# Patient Record
Sex: FEMALE | Race: WHITE | Employment: FULL TIME | URBAN - METROPOLITAN AREA
[De-identification: names, ages, dates, MRNs, and addresses within clinical notes are randomized per-mention and may not be internally consistent; named-entity substitution may affect disease eponyms.]

---

## 2017-03-06 ENCOUNTER — OFFICE VISIT (OUTPATIENT)
Dept: FAMILY MEDICINE CLINIC | Facility: CLINIC | Age: 47
End: 2017-03-06

## 2017-03-06 VITALS
TEMPERATURE: 99 F | HEART RATE: 74 BPM | BODY MASS INDEX: 20.09 KG/M2 | HEIGHT: 66 IN | RESPIRATION RATE: 18 BRPM | SYSTOLIC BLOOD PRESSURE: 110 MMHG | DIASTOLIC BLOOD PRESSURE: 60 MMHG | WEIGHT: 125 LBS

## 2017-03-06 DIAGNOSIS — Z00.00 ANNUAL PHYSICAL EXAM: Primary | ICD-10-CM

## 2017-03-06 DIAGNOSIS — D22.9 ATYPICAL NEVI: ICD-10-CM

## 2017-03-06 DIAGNOSIS — R07.89 ATYPICAL CHEST PAIN: ICD-10-CM

## 2017-03-06 PROCEDURE — 99396 PREV VISIT EST AGE 40-64: CPT | Performed by: FAMILY MEDICINE

## 2017-03-06 PROCEDURE — 93000 ELECTROCARDIOGRAM COMPLETE: CPT | Performed by: FAMILY MEDICINE

## 2017-03-06 NOTE — PROGRESS NOTES
HPI:   Taj Julio is a 55year old female who presents for a complete physical exam.     Wt Readings from Last 6 Encounters:  03/06/17 : 125 lb  02/29/16 : 123 lb  02/24/15 : 127 lb  03/17/14 : 127 lb  02/18/14 : 130 lb  12/04/12 : 120 lb    Body mass in Never Used                        Alcohol Use: Yes                Comment: occasional    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: .   Works for a nursing home  Exercise: none  Diet: watches calories closely     REVIEW OF SYSTEMS:   GENERAL: feels well otherwise  SKIN: d

## 2017-03-10 ENCOUNTER — HOSPITAL ENCOUNTER (OUTPATIENT)
Dept: CV DIAGNOSTICS | Age: 47
Discharge: HOME | End: 2017-03-10
Attending: FAMILY MEDICINE
Payer: COMMERCIAL

## 2017-03-10 DIAGNOSIS — R07.89 ATYPICAL CHEST PAIN: ICD-10-CM

## 2017-03-10 PROCEDURE — 93350 STRESS TTE ONLY: CPT | Performed by: INTERNAL MEDICINE

## 2017-03-10 PROCEDURE — 93017 CV STRESS TEST TRACING ONLY: CPT

## 2017-03-10 PROCEDURE — 93018 CV STRESS TEST I&R ONLY: CPT | Performed by: INTERNAL MEDICINE

## 2017-03-10 PROCEDURE — 93350 STRESS TTE ONLY: CPT

## 2017-03-13 DIAGNOSIS — S46.912A LEFT SHOULDER STRAIN, INITIAL ENCOUNTER: Primary | ICD-10-CM

## 2017-03-22 ENCOUNTER — TELEPHONE (OUTPATIENT)
Dept: FAMILY MEDICINE CLINIC | Facility: CLINIC | Age: 47
End: 2017-03-22

## 2017-03-22 NOTE — TELEPHONE ENCOUNTER
dropped off test results and wants Luan Morgan to view and enter into their file. Brought to Triage for review.

## 2017-04-11 ENCOUNTER — OFFICE VISIT (OUTPATIENT)
Dept: PHYSICAL THERAPY | Age: 47
End: 2017-04-11
Attending: FAMILY MEDICINE
Payer: COMMERCIAL

## 2017-04-11 DIAGNOSIS — S46.912A LEFT SHOULDER STRAIN, INITIAL ENCOUNTER: Primary | ICD-10-CM

## 2017-04-11 PROCEDURE — 97161 PT EVAL LOW COMPLEX 20 MIN: CPT

## 2017-04-11 NOTE — PROGRESS NOTES
UPPER EXTREMITY EVALUATION:   Referring Physician: Dr. Yuli Fried  Diagnosis: Left shoulder strain, initial encounter (P99.141Z)    Date of Service: 4/11/2017     PATIENT Arielle Leroy is a 55year old y/o female who presents to therapy today with c go away quick with shaking and moving  Nature: mechanical - comes on with positioning, Possibly an inflammatory component because the chest soreness is constant at varying degrees  Stage: Pain modulation and restoration of function  Stability: high, seem t Precautions:  None  OBJECTIVE:   BP: 108/68  HR: 77  Observation/Posture: moderate forward rolled shoulders, kyphotic thoracic spine (not fixed, able to readjust when drawing attention to posture)  Palpation: palpable tenderness in L pec major, L later specifically assess at next visit.       Strength/MMT:  Shoulder Elbow Wrist    Flexion: R 5/5; L 5/5  Abduction: R 5/5; L 5/5  ER: R 5/5; L 5/5  IR: R 5/5; L 5/5 Flexion: R 5/5; L 5/5  Extension: R 5/5; L 5/5   Extension: R 5/5; L 5/5  Flexion: R 5/5, L 5/ Therapeutic Exercises; Neuromuscular Re-education; Therapeutic Activity; Electrical Stim; Ultrasound;  Pt education; Home exercise program instructions;     Education or treatment limitation: None  Rehab Potential:good - eager to abolish symptoms but very u

## 2017-04-18 ENCOUNTER — OFFICE VISIT (OUTPATIENT)
Dept: PHYSICAL THERAPY | Age: 47
End: 2017-04-18
Attending: FAMILY MEDICINE
Payer: COMMERCIAL

## 2017-04-18 PROCEDURE — 97110 THERAPEUTIC EXERCISES: CPT

## 2017-04-18 PROCEDURE — 97140 MANUAL THERAPY 1/> REGIONS: CPT

## 2017-04-18 NOTE — PROGRESS NOTES
Dx: Left shoulder strain, initial encounter (T51.974L)           Authorized # of Visits:  10         Next MD visit: none scheduled  Fall Risk: standard         Precautions: n/a             Subjective: Over the last week, she had decreased intensity of symp reproduction of arm symptoms.  She is presenting today with decreased anterior left sided chest symptoms, slightly increased posterior left sided scapula/back symptoms, but is improving in postural awareness with less noted arm symptoms with provocative mot spine, neural tension and symptoms modification; education regarding symptoms and importance of therapy as well as role of therapy in her current condition; skilled selection of exercises based on presentation, impairments, severity and irritability of cur

## 2017-04-25 ENCOUNTER — OFFICE VISIT (OUTPATIENT)
Dept: PHYSICAL THERAPY | Age: 47
End: 2017-04-25
Attending: FAMILY MEDICINE
Payer: COMMERCIAL

## 2017-04-25 PROCEDURE — 97112 NEUROMUSCULAR REEDUCATION: CPT

## 2017-04-25 PROCEDURE — 97140 MANUAL THERAPY 1/> REGIONS: CPT

## 2017-04-25 PROCEDURE — 97110 THERAPEUTIC EXERCISES: CPT

## 2017-04-25 NOTE — PROGRESS NOTES
Dx: Left shoulder strain, initial encounter (O33.695P)           Authorized # of Visits:  10         Next MD visit: none scheduled  Fall Risk: standard         Precautions: n/a             Subjective: Over the last week, she has noticed less overall arm sy and compliant with HEP (10 visits)  · Pt to report ability to drive in car for >28 min with <2/10 symptoms in chest and left arm (10 visits)  · Pt will demonstrate full AROM of left shoulder without symptoms in order to improve ability to reach up to top s x 1, Nm re ed x 1 Mt x 1       Total Timed Treatment: 45 min  Total Treatment Time: 45 min

## 2017-04-27 ENCOUNTER — OFFICE VISIT (OUTPATIENT)
Dept: PHYSICAL THERAPY | Age: 47
End: 2017-04-27
Attending: FAMILY MEDICINE
Payer: COMMERCIAL

## 2017-04-27 PROCEDURE — 97110 THERAPEUTIC EXERCISES: CPT

## 2017-04-27 PROCEDURE — 97140 MANUAL THERAPY 1/> REGIONS: CPT

## 2017-04-27 PROCEDURE — 97112 NEUROMUSCULAR REEDUCATION: CPT

## 2017-04-27 NOTE — PROGRESS NOTES
Dx: Left shoulder strain, initial encounter (W63.973X)           Authorized # of Visits:  10         Next MD visit: none scheduled  Fall Risk: standard         Precautions: n/a             Subjective: states that she has not had arm symptoms since I gave t and distraction, physiologic and accessory motion of the cervical and thoracic spine in attempts to reproduce and find source of distal arm symptoms. 1 visit MET 4/20/2017   · Pt will be IND and compliant with HEP (10 visits)  · Pt to report ability to driv Supine  Radial nerve glide 10x3 (moving distal arm)    Median nerve glide (moving distal wrist) 10x3        Supine ULTT testing and passive nerve gliding and flossing    5 min Supine ULTT testing and passive nerve gliding and flossing    5 min         Foam

## 2017-05-02 ENCOUNTER — OFFICE VISIT (OUTPATIENT)
Dept: PHYSICAL THERAPY | Age: 47
End: 2017-05-02
Attending: FAMILY MEDICINE
Payer: COMMERCIAL

## 2017-05-02 PROCEDURE — 97112 NEUROMUSCULAR REEDUCATION: CPT

## 2017-05-02 PROCEDURE — 97140 MANUAL THERAPY 1/> REGIONS: CPT

## 2017-05-02 PROCEDURE — 97110 THERAPEUTIC EXERCISES: CPT

## 2017-05-02 NOTE — PROGRESS NOTES
Dx: Left shoulder strain, initial encounter (S97.586E)           Authorized # of Visits:  10         Next MD visit: none scheduled  Fall Risk: standard         Precautions: n/a             Subjective: States she has had minimal to no arm pain.  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  (10 Supine  Radial nerve glide 10x3 (moving distal arm)    Median nerve glide (moving distal wrist) 10x3 Supine  Radial nerve glide 10x3 (moving distal arm)    Median nerve glide (moving distal wrist) 10x3       Supine ULTT testing and passive nerve gliding an

## 2017-05-04 ENCOUNTER — APPOINTMENT (OUTPATIENT)
Dept: PHYSICAL THERAPY | Age: 47
End: 2017-05-04
Attending: FAMILY MEDICINE
Payer: COMMERCIAL

## 2017-05-08 ENCOUNTER — OFFICE VISIT (OUTPATIENT)
Dept: PHYSICAL THERAPY | Age: 47
End: 2017-05-08
Attending: FAMILY MEDICINE
Payer: COMMERCIAL

## 2017-05-08 PROCEDURE — 97140 MANUAL THERAPY 1/> REGIONS: CPT

## 2017-05-08 PROCEDURE — 97110 THERAPEUTIC EXERCISES: CPT

## 2017-05-08 PROCEDURE — 97112 NEUROMUSCULAR REEDUCATION: CPT

## 2017-05-08 NOTE — PROGRESS NOTES
Dx: Left shoulder strain, initial encounter (Y15.965S)           Authorized # of Visits:  10         Next MD visit: none scheduled  Fall Risk: standard         Precautions: n/a             Subjective: Patient states she has been feeling good, hasn't had ar attempts to reproduce and find source of distal arm symptoms. 1 visit MET 4/20/2017   · Pt will be IND and compliant with HEP (10 visits)  · Pt to report ability to drive in car for >76 min with <2/10 symptoms in chest and left arm (10 visits)  · Pt will de retraction with YTB ER 12x2    Seated on swiss ball RTB 8x2 ROW     Row RTB 12 x 2 Row RTB 12 x 2 Row RTB 12 x 2 Seated on swiss ball Row RTB 12 x 2    RTB 8x2 shoulder extension with swiss ball TRX Row 8x2 - verbal cues for scapular motion     Cervical si

## 2017-05-16 ENCOUNTER — OFFICE VISIT (OUTPATIENT)
Dept: PHYSICAL THERAPY | Age: 47
End: 2017-05-16
Attending: FAMILY MEDICINE
Payer: COMMERCIAL

## 2017-05-16 PROCEDURE — 97112 NEUROMUSCULAR REEDUCATION: CPT

## 2017-05-16 PROCEDURE — 97140 MANUAL THERAPY 1/> REGIONS: CPT

## 2017-05-16 PROCEDURE — 97110 THERAPEUTIC EXERCISES: CPT

## 2017-05-16 NOTE — PROGRESS NOTES
Dx: Left shoulder strain, initial encounter (X40.209M)           Authorized # of Visits:  10         Next MD visit: none scheduled  Fall Risk: standard         Precautions: n/a             Subjective: Patient states she has has increased stress this weeken will demonstrate full AROM of left shoulder without symptoms in order to improve ability to reach up to top shelf of cabinet (10 visits)  · Pt will report ability to type at work for >1 hour with <2/10 symptoms in chest and left arm to decrease interferenc 12x2       Row RTB 12 x 2 Row RTB 12 x 2 Row RTB 12 x 2 Seated on swiss ball Row RTB 12 x 2    RTB 8x2 shoulder extension with swiss ball TRX Row 8x2 - verbal cues for scapular motion TRX Row 8x2 - verbal cues for scapular motion    Cervical side glides 5 min  Total Treatment Time: 45 min

## 2017-05-23 ENCOUNTER — OFFICE VISIT (OUTPATIENT)
Dept: PHYSICAL THERAPY | Age: 47
End: 2017-05-23
Attending: FAMILY MEDICINE
Payer: COMMERCIAL

## 2017-05-23 PROCEDURE — 97110 THERAPEUTIC EXERCISES: CPT

## 2017-05-23 NOTE — PROGRESS NOTES
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  (10 visits)  · Pt to [place] 12x2    Seated on swiss ball RTB 8x2 ROW Over head millitary press 10x2 3#*    Chicken wing exercise on wall 12x2    Over head millitary press 10x2 3#    Upright row 10x2 with 3#   Row RTB 12 x 2 Row RTB 12 x 2 Row RTB 12 x 2 Seated on swiss ball Row RTB 1 skilled assessment of cervical spine, thoracic spine, neural tension and symptoms modification; education regarding symptoms and importance of therapy as well as role of therapy in her current condition; skilled selection of exercises based on presentation

## 2017-05-30 ENCOUNTER — APPOINTMENT (OUTPATIENT)
Dept: PHYSICAL THERAPY | Age: 47
End: 2017-05-30
Attending: FAMILY MEDICINE
Payer: COMMERCIAL

## 2017-06-06 ENCOUNTER — OFFICE VISIT (OUTPATIENT)
Dept: PHYSICAL THERAPY | Age: 47
End: 2017-06-06
Attending: FAMILY MEDICINE
Payer: COMMERCIAL

## 2017-06-06 PROCEDURE — 97110 THERAPEUTIC EXERCISES: CPT

## 2017-06-06 NOTE — PROGRESS NOTES
Discharge Summary    Pt has attended 9, cancelled 0, and no shown 0 visits in Physical Therapy. Subjective: States she feels like she has improved and has been able to manage her HEP independently without symptoms.  She states that she has been able to (less symptomatic than flexion)  [place] ER + flex: R T3; L T2  [place] IR + ext: R T8; L T8  Flexion: R 161; L 155 slight pull in chest   Abduction: R (WNL not formally measured);  L 145 no pain  [place] ER + flex: R T5; L T3  [place] IR + ext: R T9; L T8  (10 visits) MET 5/16/2017   · Pt will demonstrate full AROM of left shoulder without symptoms in order to improve ability to reach up to top shelf of cabinet (10 visits) MET 6/6/2017   · Pt will report ability to type at work for >1 hour with <2/10 symptom Door way pec stretch 30 sec x 3   Prone I 12x2     Prone T 12 x1  - gives arm symptoms Prone I 12x2     Prone T 12 x2 Prone I 12x2     Prone T 12 x2 Prone I 12x2 1#    Prone T  8x2 1# Swiss ball   Prone I 10x2    Prone T  10x2  Swiss ball   Prone I 10x2 nerve gliding and flossing    5 min Supine ULTT testing and passive nerve gliding and flossing    5 min Supine ULTT testing and passive nerve gliding and flossing    5 min Standing nerve flossing 5 min - median nerve and radial  Standing nerve flossing 5 m

## 2017-10-22 NOTE — PROGRESS NOTES
HPI:   Corrinne Berlin is a 52year old female here with right groin pain     It started 2 weeks ago   Size has fluctuated   Pain will radiate at times to her right labia   No fever or chills  No weight loss        Current Outpatient Prescriptions:  Elvia James distress  Right groin: + 2cm well circumscribed mass no overlying warmth or redness   EXTREMITIES: no cyanosis, clubbing or edema  NEURO: cranial nerves are intact,motor and sensory are grossly intact    ASSESSMENT AND PLAN:   Diana Umana is a 52year old

## 2017-10-23 ENCOUNTER — OFFICE VISIT (OUTPATIENT)
Dept: FAMILY MEDICINE CLINIC | Facility: CLINIC | Age: 47
End: 2017-10-23

## 2017-10-23 ENCOUNTER — TELEPHONE (OUTPATIENT)
Dept: FAMILY MEDICINE CLINIC | Facility: CLINIC | Age: 47
End: 2017-10-23

## 2017-10-23 ENCOUNTER — HOSPITAL ENCOUNTER (OUTPATIENT)
Dept: ULTRASOUND IMAGING | Age: 47
Discharge: HOME | End: 2017-10-23
Attending: FAMILY MEDICINE
Payer: COMMERCIAL

## 2017-10-23 VITALS
WEIGHT: 126 LBS | SYSTOLIC BLOOD PRESSURE: 124 MMHG | BODY MASS INDEX: 20.25 KG/M2 | RESPIRATION RATE: 18 BRPM | HEART RATE: 72 BPM | OXYGEN SATURATION: 98 % | DIASTOLIC BLOOD PRESSURE: 80 MMHG | TEMPERATURE: 99 F | HEIGHT: 66 IN

## 2017-10-23 DIAGNOSIS — R19.09 RIGHT GROIN MASS: Primary | ICD-10-CM

## 2017-10-23 DIAGNOSIS — R19.09 RIGHT GROIN MASS: ICD-10-CM

## 2017-10-23 PROCEDURE — 99213 OFFICE O/P EST LOW 20 MIN: CPT | Performed by: FAMILY MEDICINE

## 2017-10-23 PROCEDURE — 76882 US LMTD JT/FCL EVL NVASC XTR: CPT | Performed by: FAMILY MEDICINE

## 2017-10-23 NOTE — TELEPHONE ENCOUNTER
Patient called and given result of U/S of Groin. Per Dr. Reyna Sites patient referred to Dr. Vlad Gresham.  Labs ordered and entered into [facility] per patient request.

## 2017-10-23 NOTE — TELEPHONE ENCOUNTER
WANTS RESULTS OF US OF GROIN DONE TODAY. THE TECH TOLD HER WE SHOULD HAVE RESULTS IN A COUPLE HOURS. Cici Rehman PLS CALL HER BACK TO ADVISE.

## 2017-10-24 ENCOUNTER — TELEPHONE (OUTPATIENT)
Dept: FAMILY MEDICINE CLINIC | Facility: CLINIC | Age: 47
End: 2017-10-24

## 2017-10-24 DIAGNOSIS — R19.09 RIGHT GROIN MASS: Primary | ICD-10-CM

## 2017-10-24 NOTE — TELEPHONE ENCOUNTER
Please change patients labs back to [facility]. She is going to book appointment for Thursday. Please call her and let her know when changed. Thank you.

## 2017-10-26 ENCOUNTER — APPOINTMENT (OUTPATIENT)
Dept: LAB | Age: 47
End: 2017-10-26
Attending: FAMILY MEDICINE
Payer: COMMERCIAL

## 2017-10-26 PROCEDURE — 36415 COLL VENOUS BLD VENIPUNCTURE: CPT | Performed by: FAMILY MEDICINE

## 2017-10-26 PROCEDURE — 80053 COMPREHEN METABOLIC PANEL: CPT | Performed by: FAMILY MEDICINE

## 2017-10-26 PROCEDURE — 85025 COMPLETE CBC W/AUTO DIFF WBC: CPT | Performed by: FAMILY MEDICINE

## 2017-10-31 ENCOUNTER — OFFICE VISIT (OUTPATIENT)
Dept: SURGERY | Facility: CLINIC | Age: 47
End: 2017-10-31

## 2017-10-31 VITALS
HEIGHT: 67 IN | BODY MASS INDEX: 20.31 KG/M2 | DIASTOLIC BLOOD PRESSURE: 81 MMHG | SYSTOLIC BLOOD PRESSURE: 125 MMHG | WEIGHT: 129.38 LBS | HEART RATE: 78 BPM

## 2017-10-31 DIAGNOSIS — R19.09 RIGHT GROIN MASS: Primary | ICD-10-CM

## 2017-10-31 PROCEDURE — 99243 OFF/OP CNSLTJ NEW/EST LOW 30: CPT | Performed by: SURGERY

## 2017-10-31 NOTE — H&P
bkoff  New Patient  Adan Escalera  8/30/1970    10/31/2017    This patient was referred by Lacey Doe DO for evaluation and consultation for right groin mass. Chief Complaint: right groin mass    History of Present Illness:  The patient is a 40-year-old MCG/24HR Intrauterine IUD, 1 each by Intrauterine route once., Disp: , Rfl:   •  B Complex Vitamins (B COMPLEX OR), Take  by mouth., Disp: , Rfl:   •  Cholecalciferol (VITAMIN D) 1000 UNITS Oral Tab, Take 2 tablets by mouth., Disp: , Rfl:       Penicillins Assessment:      The patient is a 59-year-old female who noted a protuberance in the right groin in the first week end of October. 2 weeks later she noted that this protuberance had increased.   She was evaluated and sent for a right inguinal ultrasound

## 2017-11-03 ENCOUNTER — TELEPHONE (OUTPATIENT)
Dept: SURGERY | Facility: CLINIC | Age: 47
End: 2017-11-03

## 2017-11-03 NOTE — TELEPHONE ENCOUNTER
Called to inform pt that the CT of Pelvis is authorized from 11/3 until 12/2/17 and needs to be completed in that time frame. Pt verbalized understanding.

## 2017-11-11 ENCOUNTER — HOSPITAL ENCOUNTER (OUTPATIENT)
Dept: CT IMAGING | Age: 47
Discharge: HOME | End: 2017-11-11
Attending: SURGERY
Payer: COMMERCIAL

## 2017-11-11 DIAGNOSIS — R19.09 RIGHT GROIN MASS: ICD-10-CM

## 2017-11-11 PROCEDURE — 72193 CT PELVIS W/DYE: CPT | Performed by: SURGERY

## 2017-11-16 ENCOUNTER — OFFICE VISIT (OUTPATIENT)
Dept: SURGERY | Facility: CLINIC | Age: 47
End: 2017-11-16

## 2017-11-16 VITALS
TEMPERATURE: 99 F | HEIGHT: 67 IN | SYSTOLIC BLOOD PRESSURE: 112 MMHG | HEART RATE: 76 BPM | WEIGHT: 124 LBS | DIASTOLIC BLOOD PRESSURE: 68 MMHG | BODY MASS INDEX: 19.46 KG/M2

## 2017-11-16 DIAGNOSIS — R19.09 RIGHT GROIN MASS: Primary | ICD-10-CM

## 2017-11-16 PROCEDURE — 99213 OFFICE O/P EST LOW 20 MIN: CPT | Performed by: SURGERY

## 2017-11-30 NOTE — PROGRESS NOTES
GENERAL SURGERY    Tunde Miranda MD, FACS, Yazan Capone. Mariam Espinosa MD, Camden Anderson MD, FACS  Shannon Miranda.  Alicja Al MD, Hugo Bates MD, [facility] NATHALIE Euceda PA-C         [address] [place] [address]

## 2017-12-15 ENCOUNTER — ANESTHESIA (OUTPATIENT)
Dept: SURGERY | Facility: HOSPITAL | Age: 47
End: 2017-12-15
Payer: COMMERCIAL

## 2017-12-15 ENCOUNTER — ANESTHESIA EVENT (OUTPATIENT)
Dept: SURGERY | Facility: HOSPITAL | Age: 47
End: 2017-12-15
Payer: COMMERCIAL

## 2017-12-15 ENCOUNTER — HOSPITAL ENCOUNTER (OUTPATIENT)
Facility: HOSPITAL | Age: 47
Setting detail: HOSPITAL OUTPATIENT SURGERY
Discharge: HOME | End: 2017-12-15
Attending: SURGERY | Admitting: SURGERY
Payer: COMMERCIAL

## 2017-12-15 ENCOUNTER — SURGERY (OUTPATIENT)
Age: 47
End: 2017-12-15

## 2017-12-15 VITALS
DIASTOLIC BLOOD PRESSURE: 61 MMHG | SYSTOLIC BLOOD PRESSURE: 120 MMHG | HEIGHT: 67 IN | WEIGHT: 125 LBS | TEMPERATURE: 99 F | OXYGEN SATURATION: 100 % | HEART RATE: 70 BPM | BODY MASS INDEX: 19.62 KG/M2 | RESPIRATION RATE: 16 BRPM

## 2017-12-15 DIAGNOSIS — R19.09 RIGHT GROIN MASS: ICD-10-CM

## 2017-12-15 PROCEDURE — 81025 URINE PREGNANCY TEST: CPT | Performed by: SURGERY

## 2017-12-15 PROCEDURE — 0YQ70ZZ REPAIR RIGHT FEMORAL REGION, OPEN APPROACH: ICD-10-PCS | Performed by: SURGERY

## 2017-12-15 PROCEDURE — 88302 TISSUE EXAM BY PATHOLOGIST: CPT | Performed by: SURGERY

## 2017-12-15 RX ORDER — HYDROCODONE BITARTRATE AND ACETAMINOPHEN 5; 325 MG/1; MG/1
1-2 TABLET ORAL EVERY 6 HOURS PRN
Qty: 30 TABLET | Refills: 0 | Status: SHIPPED | OUTPATIENT
Start: 2017-12-15 | End: 2017-12-22

## 2017-12-15 RX ORDER — CLINDAMYCIN PHOSPHATE 900 MG/50ML
INJECTION INTRAVENOUS
Status: DISCONTINUED | OUTPATIENT
Start: 2017-12-15 | End: 2017-12-15

## 2017-12-15 RX ORDER — HYDROMORPHONE HYDROCHLORIDE 1 MG/ML
0.4 INJECTION, SOLUTION INTRAMUSCULAR; INTRAVENOUS; SUBCUTANEOUS EVERY 5 MIN PRN
Status: DISCONTINUED | OUTPATIENT
Start: 2017-12-15 | End: 2017-12-15

## 2017-12-15 RX ORDER — CLINDAMYCIN PHOSPHATE 900 MG/50ML
900 INJECTION INTRAVENOUS ONCE
Status: DISCONTINUED | OUTPATIENT
Start: 2017-12-15 | End: 2017-12-15 | Stop reason: HOSPADM

## 2017-12-15 RX ORDER — NALOXONE HYDROCHLORIDE 0.4 MG/ML
80 INJECTION, SOLUTION INTRAMUSCULAR; INTRAVENOUS; SUBCUTANEOUS AS NEEDED
Status: DISCONTINUED | OUTPATIENT
Start: 2017-12-15 | End: 2017-12-15

## 2017-12-15 RX ORDER — HYDROCODONE BITARTRATE AND ACETAMINOPHEN 5; 325 MG/1; MG/1
2 TABLET ORAL AS NEEDED
Status: DISCONTINUED | OUTPATIENT
Start: 2017-12-15 | End: 2017-12-15

## 2017-12-15 RX ORDER — CLINDAMYCIN PHOSPHATE 900 MG/50ML
INJECTION INTRAVENOUS
Status: DISCONTINUED
Start: 2017-12-15 | End: 2017-12-15

## 2017-12-15 RX ORDER — ONDANSETRON 2 MG/ML
4 INJECTION INTRAMUSCULAR; INTRAVENOUS AS NEEDED
Status: DISCONTINUED | OUTPATIENT
Start: 2017-12-15 | End: 2017-12-15

## 2017-12-15 RX ORDER — SODIUM CHLORIDE, SODIUM LACTATE, POTASSIUM CHLORIDE, CALCIUM CHLORIDE 600; 310; 30; 20 MG/100ML; MG/100ML; MG/100ML; MG/100ML
INJECTION, SOLUTION INTRAVENOUS CONTINUOUS
Status: DISCONTINUED | OUTPATIENT
Start: 2017-12-15 | End: 2017-12-15

## 2017-12-15 RX ORDER — DEXAMETHASONE SODIUM PHOSPHATE 4 MG/ML
4 VIAL (ML) INJECTION AS NEEDED
Status: DISCONTINUED | OUTPATIENT
Start: 2017-12-15 | End: 2017-12-15

## 2017-12-15 RX ORDER — MEPERIDINE HYDROCHLORIDE 25 MG/ML
12.5 INJECTION INTRAMUSCULAR; INTRAVENOUS; SUBCUTANEOUS AS NEEDED
Status: DISCONTINUED | OUTPATIENT
Start: 2017-12-15 | End: 2017-12-15

## 2017-12-15 RX ORDER — HEPARIN SODIUM 5000 [USP'U]/ML
5000 INJECTION, SOLUTION INTRAVENOUS; SUBCUTANEOUS ONCE
Status: DISCONTINUED | OUTPATIENT
Start: 2017-12-15 | End: 2017-12-15

## 2017-12-15 RX ORDER — MIDAZOLAM HYDROCHLORIDE 1 MG/ML
1 INJECTION INTRAMUSCULAR; INTRAVENOUS EVERY 5 MIN PRN
Status: DISCONTINUED | OUTPATIENT
Start: 2017-12-15 | End: 2017-12-15

## 2017-12-15 RX ORDER — HYDROCODONE BITARTRATE AND ACETAMINOPHEN 5; 325 MG/1; MG/1
1 TABLET ORAL AS NEEDED
Status: DISCONTINUED | OUTPATIENT
Start: 2017-12-15 | End: 2017-12-15

## 2017-12-15 RX ORDER — BUPIVACAINE HYDROCHLORIDE 5 MG/ML
INJECTION, SOLUTION EPIDURAL; INTRACAUDAL AS NEEDED
Status: DISCONTINUED | OUTPATIENT
Start: 2017-12-15 | End: 2017-12-15

## 2017-12-15 RX ORDER — HEPARIN SODIUM 5000 [USP'U]/ML
INJECTION, SOLUTION INTRAVENOUS; SUBCUTANEOUS
Status: DISCONTINUED
Start: 2017-12-15 | End: 2017-12-15 | Stop reason: WASHOUT

## 2017-12-15 NOTE — H&P
Quick  History & Physical Examination    Patient Name: Talon Og  MRN: [id number]  [facility]: [id number]  YOB: 1970    Diagnosis: right groin mass    Present Illness: *The patient is a 80-year-old female who noted a protuberance in the right groi Surgical History:  No date: COLONOSCOPY  No date: OTHER      Comment: cyst removal R wrist  No date: OTHER      Comment: oral surgery  No date: OTHER      Comment: gum surgery  6/2013: UPPER GI ENDOSCOPY PERFORMED  Family History   Problem Relation Age of

## 2017-12-15 NOTE — OPERATIVE REPORT
[facility]  Op Note    Na Valencia Location: OR   CSN [id number] MRN [id number]   Admission Date 12/15/2017 Operation Date 12/15/2017   Attending Physician Jaylan Bond MD Operating Physician Aviva Roberson MD     Date of procedure:    December 15 potential intraoperative and perioperative risks were discussed with the patient and they do not have any further questions at this time and they do wish to proceed with surgery.   The patient was marked in the preoperative holding area and the marking was condition.     EBL:    minimal    Pathologic Specimen: none      George Wright MD

## 2017-12-15 NOTE — ANESTHESIA PREPROCEDURE EVALUATION
PRE-OP EVALUATION    Patient Name: Luciano Choi    Pre-op Diagnosis: Right groin mass [R19.09]    Procedure(s):  EXCISION RIGHT GROIN MASS    Surgeon(s) and Role:     Heath See MD - Primary    Pre-op vitals reviewed.   Temp: 98.8 °F (37.1 °C)  Puls reviewed.     Lab Results  Component Value Date   WBC 5.7 10/26/2017   RBC 5.14 (H) 10/26/2017   HGB 14.9 10/26/2017   HCT 46.1 10/26/2017   MCV 89.7 10/26/2017   MCH 29.0 10/26/2017   MCHC 32.3 10/26/2017   RDW 13.3 10/26/2017   .0 10/26/2017

## 2017-12-15 NOTE — ANESTHESIA POSTPROCEDURE EVALUATION
[facility] Ulis Hutchinson Patient Status:  Hospital Outpatient Surgery   Age/Gender 52year old female MRN [id number]   [facility] SURGERY Attending Nilam Jackson, [address] [facility] [address] Day # 0 PCP Jacqueline Zamora DO       Anesthesia Post-op Note

## 2017-12-22 ENCOUNTER — OFFICE VISIT (OUTPATIENT)
Dept: SURGERY | Facility: CLINIC | Age: 47
End: 2017-12-22

## 2017-12-22 VITALS
HEIGHT: 67 IN | HEART RATE: 82 BPM | SYSTOLIC BLOOD PRESSURE: 117 MMHG | WEIGHT: 120 LBS | BODY MASS INDEX: 18.83 KG/M2 | DIASTOLIC BLOOD PRESSURE: 77 MMHG | TEMPERATURE: 99 F

## 2017-12-22 DIAGNOSIS — K41.90 FEMORAL HERNIA OF RIGHT SIDE WITHOUT OBSTRUCTION OR GANGRENE: ICD-10-CM

## 2017-12-22 DIAGNOSIS — R19.09 RIGHT GROIN MASS: Primary | ICD-10-CM

## 2017-12-22 PROCEDURE — 10140 I&D HMTMA SEROMA/FLUID COLLJ: CPT | Performed by: SURGERY

## 2017-12-22 PROCEDURE — 99024 POSTOP FOLLOW-UP VISIT: CPT | Performed by: SURGERY

## 2017-12-22 NOTE — PROGRESS NOTES
Post Operative Visit Note       Active Problems  No diagnosis found.      Chief Complaint   Patient presents with:  Hernia: 1st PO EXPLORATION RIGHT GROIN AND FEMORAL HERNIORRAPHY  on 12/15         History of Present Illness         Allergies  [facility] [place] is aller mouth every other day. Disp:  Rfl:    Cholecalciferol (VITAMIN D) 1000 UNITS Oral Tab Take 2 tablets by mouth daily. Disp:  Rfl:          Review of Systems  The Review of Systems has been reviewed by me during today.   Review of Systems   Constitutional

## 2018-03-12 ENCOUNTER — OFFICE VISIT (OUTPATIENT)
Dept: FAMILY MEDICINE CLINIC | Facility: CLINIC | Age: 48
End: 2018-03-12

## 2018-03-12 VITALS
DIASTOLIC BLOOD PRESSURE: 68 MMHG | WEIGHT: 126 LBS | RESPIRATION RATE: 20 BRPM | BODY MASS INDEX: 19.78 KG/M2 | SYSTOLIC BLOOD PRESSURE: 112 MMHG | HEART RATE: 63 BPM | HEIGHT: 67 IN | TEMPERATURE: 99 F | OXYGEN SATURATION: 98 %

## 2018-03-12 DIAGNOSIS — Z00.00 ANNUAL PHYSICAL EXAM: Primary | ICD-10-CM

## 2018-03-12 PROCEDURE — 99396 PREV VISIT EST AGE 40-64: CPT | Performed by: FAMILY MEDICINE

## 2018-03-12 NOTE — PROGRESS NOTES
HPI:   Gracy Do is a 52year old female who presents for a complete physical exam.     Wt Readings from Last 6 Encounters:  03/12/18 : 126 lb  12/22/17 : 120 lb  12/15/17 : 125 lb  11/16/17 : 124 lb  10/31/17 : 129 lb 6.4 oz  10/23/17 : 126 lb    Body denies nasal congestion, sinus pain or ST  LUNGS: denies shortness of breath with exertion  CARDIOVASCULAR: denies chest pain on exertion  GI: denies abdominal pain,denies heartburn  : denies dysuria, vaginal discharge or itching  MUSCULOSKELETAL: denies

## 2018-03-20 ENCOUNTER — TELEPHONE (OUTPATIENT)
Dept: FAMILY MEDICINE CLINIC | Facility: CLINIC | Age: 48
End: 2018-03-20

## 2018-03-20 NOTE — TELEPHONE ENCOUNTER
Medical Records received from [facility] imaging exam report. records given to khanh Hutchinson to review.

## 2018-07-19 ENCOUNTER — TELEPHONE (OUTPATIENT)
Dept: FAMILY MEDICINE CLINIC | Facility: CLINIC | Age: 48
End: 2018-07-19

## 2018-07-19 DIAGNOSIS — R79.89 ABNORMAL THYROID BLOOD TEST: Primary | ICD-10-CM

## 2018-07-23 ENCOUNTER — TELEPHONE (OUTPATIENT)
Dept: FAMILY MEDICINE CLINIC | Facility: CLINIC | Age: 48
End: 2018-07-23

## 2018-07-23 NOTE — TELEPHONE ENCOUNTER
Lab results reviewed. Creatinine slightly elevated but consistent with previous results. Recommend follow up with Dr Donald Amador to discuss further. TSH elevated per wellness screening result range but normal per [facility] lab range.  Repeat TSH and T4 in 4-6 week

## 2018-07-23 NOTE — TELEPHONE ENCOUNTER
Dr. Nato Gonzalez is out of the office for the next week. I agree with physician assistant Ferrell's recommendation.

## 2018-07-23 NOTE — TELEPHONE ENCOUNTER
Pt is aware that Dr Eric Red is out of the office until next week. Pt still would like Dr Wilfredo Lopez to review once she returns.

## 2018-07-31 NOTE — TELEPHONE ENCOUNTER
No concerns regarding kidneys with a very normal GFR  Ok for repeat thyroid labs - tsh free t4 and thyroid ab now given labs done in June

## 2018-08-04 ENCOUNTER — APPOINTMENT (OUTPATIENT)
Dept: LAB | Age: 48
End: 2018-08-04
Attending: FAMILY MEDICINE
Payer: COMMERCIAL

## 2018-08-04 DIAGNOSIS — R79.89 ABNORMAL THYROID BLOOD TEST: ICD-10-CM

## 2018-08-04 LAB
T4 FREE SERPL-MCNC: 0.9 NG/DL (ref 0.9–1.8)
TSI SER-ACNC: 2.24 MIU/ML (ref 0.35–5.5)

## 2018-08-04 PROCEDURE — 36415 COLL VENOUS BLD VENIPUNCTURE: CPT | Performed by: FAMILY MEDICINE

## 2018-08-04 PROCEDURE — 84443 ASSAY THYROID STIM HORMONE: CPT | Performed by: FAMILY MEDICINE

## 2018-08-04 PROCEDURE — 84439 ASSAY OF FREE THYROXINE: CPT | Performed by: FAMILY MEDICINE

## 2019-04-07 NOTE — PROGRESS NOTES
HPI:   Tena Palomino is a 50year old female who presents for a complete physical exam.     Wt Readings from Last 6 Encounters:  04/08/19 : 126 lb  03/12/18 : 126 lb  12/22/17 : 120 lb  12/15/17 : 125 lb  11/16/17 : 124 lb  10/31/17 : 129 lb 6.4 oz    Body REVIEW OF SYSTEMS:   GENERAL: feels well otherwise  SKIN: denies any unusual skin lesions  EYES:denies blurred vision or double vision  HEENT: denies nasal congestion, sinus pain or ST  LUNGS: denies shortness of breath with exertion  CARDIOVASCULAR: d

## 2019-04-08 ENCOUNTER — OFFICE VISIT (OUTPATIENT)
Dept: FAMILY MEDICINE CLINIC | Facility: CLINIC | Age: 49
End: 2019-04-08
Payer: COMMERCIAL

## 2019-04-08 VITALS
TEMPERATURE: 98 F | SYSTOLIC BLOOD PRESSURE: 122 MMHG | HEART RATE: 68 BPM | RESPIRATION RATE: 18 BRPM | HEIGHT: 67 IN | OXYGEN SATURATION: 98 % | WEIGHT: 126 LBS | BODY MASS INDEX: 19.78 KG/M2 | DIASTOLIC BLOOD PRESSURE: 64 MMHG

## 2019-04-08 DIAGNOSIS — Z00.00 ANNUAL PHYSICAL EXAM: Primary | ICD-10-CM

## 2019-04-08 PROCEDURE — 99396 PREV VISIT EST AGE 40-64: CPT | Performed by: FAMILY MEDICINE

## 2019-06-25 ENCOUNTER — TELEPHONE (OUTPATIENT)
Dept: FAMILY MEDICINE CLINIC | Facility: CLINIC | Age: 49
End: 2019-06-25

## 2019-06-25 NOTE — TELEPHONE ENCOUNTER
Pt's  dropped off lab test results for Dr. Jessica Pal to review, test results given to Dr. Jessica Pal to review on red folder.

## 2020-07-13 ENCOUNTER — OFFICE VISIT (OUTPATIENT)
Dept: FAMILY MEDICINE CLINIC | Facility: CLINIC | Age: 50
End: 2020-07-13
Payer: COMMERCIAL

## 2020-07-13 VITALS
SYSTOLIC BLOOD PRESSURE: 120 MMHG | DIASTOLIC BLOOD PRESSURE: 68 MMHG | RESPIRATION RATE: 18 BRPM | WEIGHT: 126 LBS | OXYGEN SATURATION: 98 % | HEIGHT: 67 IN | HEART RATE: 82 BPM | TEMPERATURE: 98 F | BODY MASS INDEX: 19.78 KG/M2

## 2020-07-13 DIAGNOSIS — Z00.00 ANNUAL PHYSICAL EXAM: Primary | ICD-10-CM

## 2020-07-13 PROCEDURE — 90471 IMMUNIZATION ADMIN: CPT | Performed by: FAMILY MEDICINE

## 2020-07-13 PROCEDURE — 90715 TDAP VACCINE 7 YRS/> IM: CPT | Performed by: FAMILY MEDICINE

## 2020-07-13 PROCEDURE — 99396 PREV VISIT EST AGE 40-64: CPT | Performed by: FAMILY MEDICINE

## 2020-07-13 NOTE — PROGRESS NOTES
HPI:   Sarai Mckeon is a 52year old female who presents for a complete physical exam.     Wt Readings from Last 6 Encounters:  07/13/20 : 126 lb (57.2 kg)  04/08/19 : 126 lb (57.2 kg)  03/12/18 : 126 lb (57.2 kg)  12/22/17 : 120 lb (54.4 kg)  12/15/17 : 1 No    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: .   Works for a nursing home  Exercise: minimal   Diet: watches calories closely     REVIEW OF SYSTEMS:   GENERAL: feels well otherwise  SKIN: denies any unusual skin lesions  EYES:denies blurred vision or double vision  HE needed.

## 2020-07-29 ENCOUNTER — LAB ENCOUNTER (OUTPATIENT)
Dept: LAB | Age: 50
End: 2020-07-29
Attending: FAMILY MEDICINE
Payer: COMMERCIAL

## 2020-07-29 DIAGNOSIS — Z00.00 ROUTINE GENERAL MEDICAL EXAMINATION AT A HEALTH CARE FACILITY: Primary | ICD-10-CM

## 2020-07-29 DIAGNOSIS — Z00.00 ANNUAL PHYSICAL EXAM: ICD-10-CM

## 2020-07-29 LAB
ALBUMIN SERPL-MCNC: 3.9 G/DL (ref 3.4–5)
ALBUMIN/GLOB SERPL: 1 {RATIO} (ref 1–2)
ALP LIVER SERPL-CCNC: 66 U/L (ref 39–100)
ALT SERPL-CCNC: 21 U/L (ref 13–56)
ANION GAP SERPL CALC-SCNC: 1 MMOL/L (ref 0–18)
AST SERPL-CCNC: 16 U/L (ref 15–37)
BASOPHILS # BLD AUTO: 0.04 X10(3) UL (ref 0–0.2)
BASOPHILS NFR BLD AUTO: 0.6 %
BILIRUB SERPL-MCNC: 0.5 MG/DL (ref 0.1–2)
BUN BLD-MCNC: 18 MG/DL (ref 7–18)
BUN/CREAT SERPL: 15.5 (ref 10–20)
CALCIUM BLD-MCNC: 8.5 MG/DL (ref 8.5–10.1)
CHLORIDE SERPL-SCNC: 105 MMOL/L (ref 98–112)
CHOLEST SMN-MCNC: 184 MG/DL (ref ?–200)
CO2 SERPL-SCNC: 29 MMOL/L (ref 21–32)
CREAT BLD-MCNC: 1.16 MG/DL (ref 0.55–1.02)
DEPRECATED HBV CORE AB SER IA-ACNC: 4 NG/ML (ref 12–240)
DEPRECATED RDW RBC AUTO: 46.9 FL (ref 35.1–46.3)
EOSINOPHIL # BLD AUTO: 0.15 X10(3) UL (ref 0–0.7)
EOSINOPHIL NFR BLD AUTO: 2.3 %
ERYTHROCYTE [DISTWIDTH] IN BLOOD BY AUTOMATED COUNT: 15.5 % (ref 11–15)
GLOBULIN PLAS-MCNC: 3.8 G/DL (ref 2.8–4.4)
GLUCOSE BLD-MCNC: 91 MG/DL (ref 70–99)
HBV SURFACE AB SER QL: REACTIVE
HBV SURFACE AB SERPL IA-ACNC: 38.03 MIU/ML
HCT VFR BLD AUTO: 42.1 % (ref 35–48)
HDLC SERPL-MCNC: 86 MG/DL (ref 40–59)
HGB BLD-MCNC: 12.3 G/DL (ref 12–16)
IMM GRANULOCYTES # BLD AUTO: 0.01 X10(3) UL (ref 0–1)
IMM GRANULOCYTES NFR BLD: 0.2 %
LDLC SERPL CALC-MCNC: 87 MG/DL (ref ?–100)
LYMPHOCYTES # BLD AUTO: 2.06 X10(3) UL (ref 1–4)
LYMPHOCYTES NFR BLD AUTO: 31.1 %
M PROTEIN MFR SERPL ELPH: 7.7 G/DL (ref 6.4–8.2)
MCH RBC QN AUTO: 24.3 PG (ref 26–34)
MCHC RBC AUTO-ENTMCNC: 29.2 G/DL (ref 31–37)
MCV RBC AUTO: 83.2 FL (ref 80–100)
MONOCYTES # BLD AUTO: 0.43 X10(3) UL (ref 0.1–1)
MONOCYTES NFR BLD AUTO: 6.5 %
NEUTROPHILS # BLD AUTO: 3.94 X10 (3) UL (ref 1.5–7.7)
NEUTROPHILS # BLD AUTO: 3.94 X10(3) UL (ref 1.5–7.7)
NEUTROPHILS NFR BLD AUTO: 59.3 %
NONHDLC SERPL-MCNC: 98 MG/DL (ref ?–130)
OSMOLALITY SERPL CALC.SUM OF ELEC: 281 MOSM/KG (ref 275–295)
PATIENT FASTING Y/N/NP: YES
PATIENT FASTING Y/N/NP: YES
PLATELET # BLD AUTO: 250 10(3)UL (ref 150–450)
POTASSIUM SERPL-SCNC: 4.4 MMOL/L (ref 3.5–5.1)
RBC # BLD AUTO: 5.06 X10(6)UL (ref 3.8–5.3)
SARS-COV-2 IGG SERPLBLD QL IA.RAPID: NEGATIVE
SODIUM SERPL-SCNC: 135 MMOL/L (ref 136–145)
T4 FREE SERPL-MCNC: 1 NG/DL (ref 0.8–1.7)
TRIGL SERPL-MCNC: 54 MG/DL (ref 30–149)
TSI SER-ACNC: 2.99 MIU/ML (ref 0.36–3.74)
VIT B12 SERPL-MCNC: 412 PG/ML (ref 193–986)
VIT D+METAB SERPL-MCNC: 49.7 NG/ML (ref 30–100)
VLDLC SERPL CALC-MCNC: 11 MG/DL (ref 0–30)
WBC # BLD AUTO: 6.6 X10(3) UL (ref 4–11)

## 2020-07-29 PROCEDURE — 36415 COLL VENOUS BLD VENIPUNCTURE: CPT | Performed by: FAMILY MEDICINE

## 2020-07-29 PROCEDURE — 80050 GENERAL HEALTH PANEL: CPT | Performed by: FAMILY MEDICINE

## 2020-07-29 PROCEDURE — 80061 LIPID PANEL: CPT | Performed by: FAMILY MEDICINE

## 2020-07-29 PROCEDURE — 82607 VITAMIN B-12: CPT | Performed by: FAMILY MEDICINE

## 2020-07-29 PROCEDURE — 84439 ASSAY OF FREE THYROXINE: CPT | Performed by: FAMILY MEDICINE

## 2020-07-29 PROCEDURE — 86769 SARS-COV-2 COVID-19 ANTIBODY: CPT | Performed by: FAMILY MEDICINE

## 2020-07-29 PROCEDURE — 86706 HEP B SURFACE ANTIBODY: CPT | Performed by: FAMILY MEDICINE

## 2020-07-29 PROCEDURE — 82728 ASSAY OF FERRITIN: CPT | Performed by: FAMILY MEDICINE

## 2020-07-29 PROCEDURE — 82306 VITAMIN D 25 HYDROXY: CPT | Performed by: FAMILY MEDICINE

## 2020-08-03 DIAGNOSIS — R79.89 ELEVATED SERUM CREATININE: Primary | ICD-10-CM

## 2020-08-03 DIAGNOSIS — R79.0 LOW FERRITIN: ICD-10-CM

## 2020-08-03 DIAGNOSIS — E53.8 VITAMIN B12 DEFICIENCY: ICD-10-CM

## 2020-09-08 ENCOUNTER — LAB ENCOUNTER (OUTPATIENT)
Dept: LAB | Age: 50
End: 2020-09-08
Attending: FAMILY MEDICINE
Payer: COMMERCIAL

## 2020-09-08 DIAGNOSIS — R79.89 ELEVATED SERUM CREATININE: ICD-10-CM

## 2020-09-08 LAB
ALBUMIN SERPL-MCNC: 3.6 G/DL (ref 3.4–5)
ALBUMIN/GLOB SERPL: 1.1 {RATIO} (ref 1–2)
ALP LIVER SERPL-CCNC: 57 U/L (ref 39–100)
ALT SERPL-CCNC: 18 U/L (ref 13–56)
ANION GAP SERPL CALC-SCNC: 6 MMOL/L (ref 0–18)
AST SERPL-CCNC: 15 U/L (ref 15–37)
BILIRUB SERPL-MCNC: 0.5 MG/DL (ref 0.1–2)
BUN BLD-MCNC: 15 MG/DL (ref 7–18)
BUN/CREAT SERPL: 13.4 (ref 10–20)
CALCIUM BLD-MCNC: 9.2 MG/DL (ref 8.5–10.1)
CHLORIDE SERPL-SCNC: 103 MMOL/L (ref 98–112)
CO2 SERPL-SCNC: 28 MMOL/L (ref 21–32)
CREAT BLD-MCNC: 1.12 MG/DL (ref 0.55–1.02)
GLOBULIN PLAS-MCNC: 3.3 G/DL (ref 2.8–4.4)
GLUCOSE BLD-MCNC: 82 MG/DL (ref 70–99)
M PROTEIN MFR SERPL ELPH: 6.9 G/DL (ref 6.4–8.2)
OSMOLALITY SERPL CALC.SUM OF ELEC: 284 MOSM/KG (ref 275–295)
PATIENT FASTING Y/N/NP: YES
POTASSIUM SERPL-SCNC: 4.2 MMOL/L (ref 3.5–5.1)
SODIUM SERPL-SCNC: 137 MMOL/L (ref 136–145)

## 2020-09-08 PROCEDURE — 80053 COMPREHEN METABOLIC PANEL: CPT | Performed by: FAMILY MEDICINE

## 2020-09-08 PROCEDURE — 36415 COLL VENOUS BLD VENIPUNCTURE: CPT | Performed by: FAMILY MEDICINE

## 2020-11-04 ENCOUNTER — LAB ENCOUNTER (OUTPATIENT)
Dept: LAB | Age: 50
End: 2020-11-04
Attending: FAMILY MEDICINE
Payer: COMMERCIAL

## 2020-11-04 DIAGNOSIS — R79.0 LOW FERRITIN: ICD-10-CM

## 2020-11-04 DIAGNOSIS — E53.8 VITAMIN B12 DEFICIENCY: ICD-10-CM

## 2020-11-04 PROCEDURE — 82607 VITAMIN B-12: CPT

## 2020-11-04 PROCEDURE — 85025 COMPLETE CBC W/AUTO DIFF WBC: CPT

## 2020-11-04 PROCEDURE — 36415 COLL VENOUS BLD VENIPUNCTURE: CPT

## 2020-11-04 PROCEDURE — 82728 ASSAY OF FERRITIN: CPT

## 2021-01-22 ENCOUNTER — PATIENT MESSAGE (OUTPATIENT)
Dept: FAMILY MEDICINE CLINIC | Facility: CLINIC | Age: 51
End: 2021-01-22

## 2021-01-23 NOTE — TELEPHONE ENCOUNTER
From: Talon Og  To: Stephaine Burrows DO  Sent: 1/22/2021 3:45 PM CST  Subject: Other    Hi - Can you please upload the attached information regarding my covid vaccination to my file.   Thank you - Duke Richard

## 2021-05-06 ENCOUNTER — OFFICE VISIT (OUTPATIENT)
Dept: FAMILY MEDICINE CLINIC | Facility: CLINIC | Age: 51
End: 2021-05-06
Payer: COMMERCIAL

## 2021-05-06 ENCOUNTER — LAB ENCOUNTER (OUTPATIENT)
Dept: LAB | Age: 51
End: 2021-05-06
Attending: FAMILY MEDICINE
Payer: COMMERCIAL

## 2021-05-06 VITALS
HEIGHT: 67 IN | DIASTOLIC BLOOD PRESSURE: 80 MMHG | HEART RATE: 84 BPM | BODY MASS INDEX: 19.62 KG/M2 | TEMPERATURE: 98 F | RESPIRATION RATE: 18 BRPM | OXYGEN SATURATION: 99 % | WEIGHT: 125 LBS | SYSTOLIC BLOOD PRESSURE: 110 MMHG

## 2021-05-06 DIAGNOSIS — Z00.00 ANNUAL PHYSICAL EXAM: Primary | ICD-10-CM

## 2021-05-06 PROCEDURE — 3079F DIAST BP 80-89 MM HG: CPT | Performed by: FAMILY MEDICINE

## 2021-05-06 PROCEDURE — 3008F BODY MASS INDEX DOCD: CPT | Performed by: FAMILY MEDICINE

## 2021-05-06 PROCEDURE — 80061 LIPID PANEL: CPT | Performed by: FAMILY MEDICINE

## 2021-05-06 PROCEDURE — 99396 PREV VISIT EST AGE 40-64: CPT | Performed by: FAMILY MEDICINE

## 2021-05-06 PROCEDURE — 84439 ASSAY OF FREE THYROXINE: CPT | Performed by: FAMILY MEDICINE

## 2021-05-06 PROCEDURE — 82306 VITAMIN D 25 HYDROXY: CPT | Performed by: FAMILY MEDICINE

## 2021-05-06 PROCEDURE — 82607 VITAMIN B-12: CPT | Performed by: FAMILY MEDICINE

## 2021-05-06 PROCEDURE — 3074F SYST BP LT 130 MM HG: CPT | Performed by: FAMILY MEDICINE

## 2021-05-06 PROCEDURE — 82728 ASSAY OF FERRITIN: CPT | Performed by: FAMILY MEDICINE

## 2021-05-06 PROCEDURE — 80050 GENERAL HEALTH PANEL: CPT | Performed by: FAMILY MEDICINE

## 2021-05-06 RX ORDER — PNV NO.95/FERROUS FUM/FOLIC AC 28MG-0.8MG
TABLET ORAL
COMMUNITY
Start: 2020-08-01

## 2021-05-06 RX ORDER — DOCUSATE SODIUM 100 MG/1
CAPSULE, LIQUID FILLED ORAL
COMMUNITY
Start: 2020-08-01

## 2021-05-06 NOTE — PROGRESS NOTES
HPI:   Patricia Joy is a 48year old female who presents for a complete physical exam.     Wt Readings from Last 6 Encounters:  05/06/21 : 125 lb (56.7 kg)  07/13/20 : 126 lb (57.2 kg)  04/08/19 : 126 lb (57.2 kg)  03/12/18 : 126 lb (57.2 kg)  12/22/17 : 1 lesions  EYES:denies blurred vision or double vision  HEENT: denies nasal congestion, sinus pain or ST  LUNGS: denies shortness of breath with exertion  CARDIOVASCULAR: denies chest pain on exertion  GI: denies abdominal pain,denies heartburn  : denies d Attending Only

## 2021-05-28 ENCOUNTER — LAB ENCOUNTER (OUTPATIENT)
Dept: LAB | Age: 51
End: 2021-05-28
Attending: FAMILY MEDICINE
Payer: COMMERCIAL

## 2021-05-28 PROCEDURE — 80053 COMPREHEN METABOLIC PANEL: CPT | Performed by: FAMILY MEDICINE

## 2021-07-28 ENCOUNTER — LAB ENCOUNTER (OUTPATIENT)
Dept: LAB | Age: 51
End: 2021-07-28
Attending: FAMILY MEDICINE
Payer: COMMERCIAL

## 2021-07-28 DIAGNOSIS — R79.89 ELEVATED SERUM CREATININE: ICD-10-CM

## 2021-07-28 LAB
ALBUMIN SERPL-MCNC: 4.1 G/DL (ref 3.4–5)
ALBUMIN/GLOB SERPL: 1.3 {RATIO} (ref 1–2)
ALP LIVER SERPL-CCNC: 61 U/L
ALT SERPL-CCNC: 25 U/L
ANION GAP SERPL CALC-SCNC: 2 MMOL/L (ref 0–18)
AST SERPL-CCNC: 10 U/L (ref 15–37)
BILIRUB SERPL-MCNC: 0.7 MG/DL (ref 0.1–2)
BUN BLD-MCNC: 15 MG/DL (ref 7–18)
BUN/CREAT SERPL: 13.8 (ref 10–20)
CALCIUM BLD-MCNC: 8.8 MG/DL (ref 8.5–10.1)
CHLORIDE SERPL-SCNC: 104 MMOL/L (ref 98–112)
CO2 SERPL-SCNC: 31 MMOL/L (ref 21–32)
CREAT BLD-MCNC: 1.09 MG/DL
GLOBULIN PLAS-MCNC: 3.2 G/DL (ref 2.8–4.4)
GLUCOSE BLD-MCNC: 83 MG/DL (ref 70–99)
M PROTEIN MFR SERPL ELPH: 7.3 G/DL (ref 6.4–8.2)
OSMOLALITY SERPL CALC.SUM OF ELEC: 284 MOSM/KG (ref 275–295)
PATIENT FASTING Y/N/NP: YES
POTASSIUM SERPL-SCNC: 4 MMOL/L (ref 3.5–5.1)
SODIUM SERPL-SCNC: 137 MMOL/L (ref 136–145)

## 2021-07-28 PROCEDURE — 80053 COMPREHEN METABOLIC PANEL: CPT | Performed by: FAMILY MEDICINE

## 2022-04-08 ENCOUNTER — PATIENT MESSAGE (OUTPATIENT)
Dept: FAMILY MEDICINE CLINIC | Facility: CLINIC | Age: 52
End: 2022-04-08

## 2022-04-08 NOTE — TELEPHONE ENCOUNTER
From: Alison Duverney  To: Cyril Wesley DO  Sent: 4/8/2022 9:30 AM CDT  Subject: lab work    Hi - I am scheduled for my annual visit on May 11th and was hoping to have the bloodwork done before the visit so we can discuss. Can you please put in orders for bloodwork so I can schedule those the week before the appt and we can review at the appt.  Thank you

## 2022-05-04 ENCOUNTER — LAB ENCOUNTER (OUTPATIENT)
Dept: LAB | Age: 52
End: 2022-05-04
Attending: FAMILY MEDICINE
Payer: COMMERCIAL

## 2022-05-04 DIAGNOSIS — Z00.00 ANNUAL PHYSICAL EXAM: ICD-10-CM

## 2022-05-04 LAB
ALBUMIN SERPL-MCNC: 3.8 G/DL (ref 3.4–5)
ALBUMIN/GLOB SERPL: 1.4 {RATIO} (ref 1–2)
ALP LIVER SERPL-CCNC: 49 U/L
ALT SERPL-CCNC: 20 U/L
ANION GAP SERPL CALC-SCNC: 5 MMOL/L (ref 0–18)
AST SERPL-CCNC: 9 U/L (ref 15–37)
BASOPHILS # BLD AUTO: 0.03 X10(3) UL (ref 0–0.2)
BASOPHILS NFR BLD AUTO: 0.4 %
BILIRUB SERPL-MCNC: 0.6 MG/DL (ref 0.1–2)
BUN BLD-MCNC: 16 MG/DL (ref 7–18)
CALCIUM BLD-MCNC: 8.9 MG/DL (ref 8.5–10.1)
CHLORIDE SERPL-SCNC: 106 MMOL/L (ref 98–112)
CHOLEST SERPL-MCNC: 157 MG/DL (ref ?–200)
CO2 SERPL-SCNC: 28 MMOL/L (ref 21–32)
CREAT BLD-MCNC: 1.07 MG/DL
EOSINOPHIL # BLD AUTO: 0.1 X10(3) UL (ref 0–0.7)
EOSINOPHIL NFR BLD AUTO: 1.4 %
ERYTHROCYTE [DISTWIDTH] IN BLOOD BY AUTOMATED COUNT: 13.9 %
FASTING PATIENT LIPID ANSWER: YES
FASTING STATUS PATIENT QL REPORTED: YES
GLOBULIN PLAS-MCNC: 2.7 G/DL (ref 2.8–4.4)
GLUCOSE BLD-MCNC: 95 MG/DL (ref 70–99)
HCT VFR BLD AUTO: 43.6 %
HDLC SERPL-MCNC: 76 MG/DL (ref 40–59)
HGB BLD-MCNC: 14.4 G/DL
IMM GRANULOCYTES # BLD AUTO: 0.01 X10(3) UL (ref 0–1)
IMM GRANULOCYTES NFR BLD: 0.1 %
LDLC SERPL CALC-MCNC: 66 MG/DL (ref ?–100)
LYMPHOCYTES # BLD AUTO: 1.81 X10(3) UL (ref 1–4)
LYMPHOCYTES NFR BLD AUTO: 25.4 %
MCH RBC QN AUTO: 30.2 PG (ref 26–34)
MCHC RBC AUTO-ENTMCNC: 33 G/DL (ref 31–37)
MCV RBC AUTO: 91.4 FL
MONOCYTES # BLD AUTO: 0.36 X10(3) UL (ref 0.1–1)
MONOCYTES NFR BLD AUTO: 5.1 %
NEUTROPHILS # BLD AUTO: 4.81 X10 (3) UL (ref 1.5–7.7)
NEUTROPHILS # BLD AUTO: 4.81 X10(3) UL (ref 1.5–7.7)
NEUTROPHILS NFR BLD AUTO: 67.6 %
NONHDLC SERPL-MCNC: 81 MG/DL (ref ?–130)
OSMOLALITY SERPL CALC.SUM OF ELEC: 289 MOSM/KG (ref 275–295)
PLATELET # BLD AUTO: 192 10(3)UL (ref 150–450)
POTASSIUM SERPL-SCNC: 4 MMOL/L (ref 3.5–5.1)
PROT SERPL-MCNC: 6.5 G/DL (ref 6.4–8.2)
RBC # BLD AUTO: 4.77 X10(6)UL
SODIUM SERPL-SCNC: 139 MMOL/L (ref 136–145)
TRIGL SERPL-MCNC: 77 MG/DL (ref 30–149)
TSI SER-ACNC: 3.69 MIU/ML (ref 0.36–3.74)
VIT B12 SERPL-MCNC: 661 PG/ML (ref 193–986)
VIT D+METAB SERPL-MCNC: 48.8 NG/ML (ref 30–100)
VLDLC SERPL CALC-MCNC: 12 MG/DL (ref 0–30)
WBC # BLD AUTO: 7.1 X10(3) UL (ref 4–11)

## 2022-05-04 PROCEDURE — 82607 VITAMIN B-12: CPT | Performed by: FAMILY MEDICINE

## 2022-05-04 PROCEDURE — 82306 VITAMIN D 25 HYDROXY: CPT | Performed by: FAMILY MEDICINE

## 2022-05-04 PROCEDURE — 80061 LIPID PANEL: CPT | Performed by: FAMILY MEDICINE

## 2022-05-04 PROCEDURE — 80050 GENERAL HEALTH PANEL: CPT | Performed by: FAMILY MEDICINE

## 2022-05-11 ENCOUNTER — OFFICE VISIT (OUTPATIENT)
Dept: FAMILY MEDICINE CLINIC | Facility: CLINIC | Age: 52
End: 2022-05-11
Payer: COMMERCIAL

## 2022-05-11 VITALS
SYSTOLIC BLOOD PRESSURE: 112 MMHG | DIASTOLIC BLOOD PRESSURE: 74 MMHG | RESPIRATION RATE: 16 BRPM | HEIGHT: 67 IN | WEIGHT: 130 LBS | BODY MASS INDEX: 20.4 KG/M2 | HEART RATE: 81 BPM | OXYGEN SATURATION: 98 %

## 2022-05-11 DIAGNOSIS — Z00.00 ANNUAL PHYSICAL EXAM: Primary | ICD-10-CM

## 2022-05-11 PROCEDURE — 3074F SYST BP LT 130 MM HG: CPT | Performed by: FAMILY MEDICINE

## 2022-05-11 PROCEDURE — 3078F DIAST BP <80 MM HG: CPT | Performed by: FAMILY MEDICINE

## 2022-05-11 PROCEDURE — 3008F BODY MASS INDEX DOCD: CPT | Performed by: FAMILY MEDICINE

## 2022-05-11 PROCEDURE — 99396 PREV VISIT EST AGE 40-64: CPT | Performed by: FAMILY MEDICINE

## 2022-06-03 ENCOUNTER — NURSE ONLY (OUTPATIENT)
Dept: FAMILY MEDICINE CLINIC | Facility: CLINIC | Age: 52
End: 2022-06-03
Payer: COMMERCIAL

## 2022-06-03 PROCEDURE — 90750 HZV VACC RECOMBINANT IM: CPT | Performed by: FAMILY MEDICINE

## 2022-06-03 PROCEDURE — 90471 IMMUNIZATION ADMIN: CPT | Performed by: FAMILY MEDICINE

## 2022-09-07 ENCOUNTER — PATIENT MESSAGE (OUTPATIENT)
Dept: FAMILY MEDICINE CLINIC | Facility: CLINIC | Age: 52
End: 2022-09-07

## 2022-09-07 DIAGNOSIS — Z23 NEED FOR SHINGLES VACCINE: Primary | ICD-10-CM

## 2022-09-07 NOTE — TELEPHONE ENCOUNTER
From: Lencho Ontiveros  To: Babar Dubose DO  Sent: 9/7/2022 10:48 AM CDT  Subject: moving nurse appointment    Hi - I am wondering if it is possible to move my nurse appt for the 2nd shingles shot from 10/7 to 9/23? Please let me know another date near there.

## 2022-09-23 ENCOUNTER — NURSE ONLY (OUTPATIENT)
Dept: FAMILY MEDICINE CLINIC | Facility: CLINIC | Age: 52
End: 2022-09-23

## 2022-09-23 PROCEDURE — 90750 HZV VACC RECOMBINANT IM: CPT | Performed by: FAMILY MEDICINE

## 2022-09-23 PROCEDURE — 90471 IMMUNIZATION ADMIN: CPT | Performed by: FAMILY MEDICINE

## 2022-11-02 ENCOUNTER — PATIENT MESSAGE (OUTPATIENT)
Dept: FAMILY MEDICINE CLINIC | Facility: CLINIC | Age: 52
End: 2022-11-02

## 2022-11-02 NOTE — TELEPHONE ENCOUNTER
From: Fernando Harman  To: Marta Rivera DO  Sent: 11/2/2022 12:40 PM CDT  Subject: covid booster & Mammogram    I had my covid booster today and am attaching my updated card. I also have attached my mammogram proof I went on 9/6 and got called back and went for more on 9/9/22 and was told all good for another year.   Please update my records - thank you

## 2023-02-07 ENCOUNTER — OFFICE VISIT (OUTPATIENT)
Dept: PODIATRY CLINIC | Facility: CLINIC | Age: 53
End: 2023-02-07

## 2023-02-07 DIAGNOSIS — B07.0 VERRUCA PLANTARIS: Primary | ICD-10-CM

## 2023-02-07 PROCEDURE — 99203 OFFICE O/P NEW LOW 30 MIN: CPT | Performed by: PODIATRIST

## 2023-02-21 ENCOUNTER — TELEPHONE (OUTPATIENT)
Dept: PODIATRY CLINIC | Facility: CLINIC | Age: 53
End: 2023-02-21

## 2023-02-21 NOTE — TELEPHONE ENCOUNTER
Pt was in 2 weeks ago for appt and  told her he would order Advanced Circulatoryharone Plus and call her - calling for update

## 2023-02-21 NOTE — TELEPHONE ENCOUNTER
S/w Xenia and cantharone plus is not available and may not ever be available. Informed pt of this and that Dr Isauro Ribera is out of the office this wk but will s/w him when he returns on next steps for pt.

## 2023-02-27 NOTE — TELEPHONE ENCOUNTER
Please tell the patient that we can certainly take the wart off either using lasering or inhaled hyfrecation need or we can just treat it with a more topical acid but the topical acid which we will have to find a compounding pharmacy for and oftentimes insurance companies do not cover that.

## 2023-03-01 NOTE — TELEPHONE ENCOUNTER
Spoke to patient and relayed Dr. Manuela Higigns message as shown below. Patient verbalized understanding. Appointment scheduled for patient to go over treatment options in person and more in debt. No further action required at this time.

## 2023-03-15 ENCOUNTER — OFFICE VISIT (OUTPATIENT)
Dept: PODIATRY CLINIC | Facility: CLINIC | Age: 53
End: 2023-03-15

## 2023-03-15 DIAGNOSIS — B07.0 VERRUCA PLANTARIS: Primary | ICD-10-CM

## 2023-03-15 PROCEDURE — 99213 OFFICE O/P EST LOW 20 MIN: CPT | Performed by: PODIATRIST

## 2023-03-15 RX ORDER — SALICYLIC ACID 275 MG/ML
SOLUTION TOPICAL
Qty: 10 ML | Refills: 2 | Status: SHIPPED | OUTPATIENT
Start: 2023-03-15

## 2023-03-15 NOTE — PATIENT INSTRUCTIONS
The following are the instructions on how to treat your wart at home:  1. Every day after showering or bathing file the wart down with a pumice stone or emery board. Once the old acid is some of the callus is removed apply a new application of the salicylic acid and allow it to dry for a few minutes. Cover with duct tape  2. Keep repeating this daily unless it becomes too sensitive then skip 1 day or skip 2 days until you can begin reapplying it without sensitivity  3.   If you are having any difficulty with it or you are not sure please have Dr. Yarbrough Right paged by calling [phone]

## 2023-04-11 ENCOUNTER — OFFICE VISIT (OUTPATIENT)
Dept: PODIATRY CLINIC | Facility: CLINIC | Age: 53
End: 2023-04-11

## 2023-04-11 DIAGNOSIS — B07.0 VERRUCA PLANTARIS: Primary | ICD-10-CM

## 2023-04-11 PROCEDURE — 99213 OFFICE O/P EST LOW 20 MIN: CPT | Performed by: PODIATRIST

## 2023-04-25 ENCOUNTER — PATIENT MESSAGE (OUTPATIENT)
Dept: FAMILY MEDICINE CLINIC | Facility: CLINIC | Age: 53
End: 2023-04-25

## 2023-04-26 NOTE — TELEPHONE ENCOUNTER
From: Tay Dumont  To: Gennaro Ferrell DO  Sent: 4/25/2023 8:10 PM CDT  Subject: lab orders    Hi - Can you make sure that Dr Abran Amado has my labs orders in for my annual lab draw as I will be going to the Lab in the same building as her office visits on May 5th for them to be drawn so we can discuss at my annual visit on May 17th.  Thank you :)

## 2023-05-04 ENCOUNTER — OFFICE VISIT (OUTPATIENT)
Dept: PODIATRY CLINIC | Facility: CLINIC | Age: 53
End: 2023-05-04

## 2023-05-04 DIAGNOSIS — B07.0 VERRUCA PLANTARIS: Primary | ICD-10-CM

## 2023-05-04 PROCEDURE — 99213 OFFICE O/P EST LOW 20 MIN: CPT | Performed by: PODIATRIST

## 2023-05-05 ENCOUNTER — LAB ENCOUNTER (OUTPATIENT)
Dept: LAB | Age: 53
End: 2023-05-05
Attending: FAMILY MEDICINE
Payer: COMMERCIAL

## 2023-05-05 LAB
ALBUMIN SERPL-MCNC: 3.9 G/DL (ref 3.4–5)
ALBUMIN/GLOB SERPL: 1.3 {RATIO} (ref 1–2)
ALP LIVER SERPL-CCNC: 54 U/L
ALT SERPL-CCNC: 19 U/L
ANION GAP SERPL CALC-SCNC: 6 MMOL/L (ref 0–18)
AST SERPL-CCNC: 14 U/L (ref 15–37)
BASOPHILS # BLD AUTO: 0.04 X10(3) UL (ref 0–0.2)
BASOPHILS NFR BLD AUTO: 0.8 %
BILIRUB SERPL-MCNC: 0.6 MG/DL (ref 0.1–2)
BUN BLD-MCNC: 16 MG/DL (ref 7–18)
CALCIUM BLD-MCNC: 8.3 MG/DL (ref 8.5–10.1)
CHLORIDE SERPL-SCNC: 106 MMOL/L (ref 98–112)
CHOLEST SERPL-MCNC: 156 MG/DL (ref ?–200)
CO2 SERPL-SCNC: 26 MMOL/L (ref 21–32)
CREAT BLD-MCNC: 1.03 MG/DL
DEPRECATED HBV CORE AB SER IA-ACNC: 124.5 NG/ML
EOSINOPHIL # BLD AUTO: 0.19 X10(3) UL (ref 0–0.7)
EOSINOPHIL NFR BLD AUTO: 3.7 %
ERYTHROCYTE [DISTWIDTH] IN BLOOD BY AUTOMATED COUNT: 14 %
FASTING PATIENT LIPID ANSWER: YES
FASTING STATUS PATIENT QL REPORTED: YES
GFR SERPLBLD BASED ON 1.73 SQ M-ARVRAT: 65 ML/MIN/1.73M2 (ref 60–?)
GLOBULIN PLAS-MCNC: 2.9 G/DL (ref 2.8–4.4)
GLUCOSE BLD-MCNC: 92 MG/DL (ref 70–99)
HCT VFR BLD AUTO: 45.9 %
HDLC SERPL-MCNC: 81 MG/DL (ref 40–59)
HGB BLD-MCNC: 14.6 G/DL
IMM GRANULOCYTES # BLD AUTO: 0.01 X10(3) UL (ref 0–1)
IMM GRANULOCYTES NFR BLD: 0.2 %
LDLC SERPL CALC-MCNC: 63 MG/DL (ref ?–100)
LYMPHOCYTES # BLD AUTO: 1.53 X10(3) UL (ref 1–4)
LYMPHOCYTES NFR BLD AUTO: 29.4 %
MCH RBC QN AUTO: 29.3 PG (ref 26–34)
MCHC RBC AUTO-ENTMCNC: 31.8 G/DL (ref 31–37)
MCV RBC AUTO: 92.2 FL
MONOCYTES # BLD AUTO: 0.26 X10(3) UL (ref 0.1–1)
MONOCYTES NFR BLD AUTO: 5 %
NEUTROPHILS # BLD AUTO: 3.17 X10 (3) UL (ref 1.5–7.7)
NEUTROPHILS # BLD AUTO: 3.17 X10(3) UL (ref 1.5–7.7)
NEUTROPHILS NFR BLD AUTO: 60.9 %
NONHDLC SERPL-MCNC: 75 MG/DL (ref ?–130)
OSMOLALITY SERPL CALC.SUM OF ELEC: 287 MOSM/KG (ref 275–295)
PLATELET # BLD AUTO: 192 10(3)UL (ref 150–450)
POTASSIUM SERPL-SCNC: 3.7 MMOL/L (ref 3.5–5.1)
PROT SERPL-MCNC: 6.8 G/DL (ref 6.4–8.2)
RBC # BLD AUTO: 4.98 X10(6)UL
SODIUM SERPL-SCNC: 138 MMOL/L (ref 136–145)
T4 FREE SERPL-MCNC: 0.9 NG/DL (ref 0.8–1.7)
TRIGL SERPL-MCNC: 62 MG/DL (ref 30–149)
TSI SER-ACNC: 2.37 MIU/ML (ref 0.36–3.74)
VIT B12 SERPL-MCNC: 1021 PG/ML (ref 193–986)
VIT D+METAB SERPL-MCNC: 52.7 NG/ML (ref 30–100)
VLDLC SERPL CALC-MCNC: 9 MG/DL (ref 0–30)
WBC # BLD AUTO: 5.2 X10(3) UL (ref 4–11)

## 2023-05-05 PROCEDURE — 80061 LIPID PANEL: CPT | Performed by: FAMILY MEDICINE

## 2023-05-05 PROCEDURE — 80050 GENERAL HEALTH PANEL: CPT | Performed by: FAMILY MEDICINE

## 2023-05-05 PROCEDURE — 82728 ASSAY OF FERRITIN: CPT | Performed by: FAMILY MEDICINE

## 2023-05-05 PROCEDURE — 82306 VITAMIN D 25 HYDROXY: CPT | Performed by: FAMILY MEDICINE

## 2023-05-05 PROCEDURE — 82607 VITAMIN B-12: CPT | Performed by: FAMILY MEDICINE

## 2023-05-05 PROCEDURE — 84439 ASSAY OF FREE THYROXINE: CPT | Performed by: FAMILY MEDICINE

## 2023-05-17 ENCOUNTER — OFFICE VISIT (OUTPATIENT)
Dept: FAMILY MEDICINE CLINIC | Facility: CLINIC | Age: 53
End: 2023-05-17
Payer: COMMERCIAL

## 2023-05-17 VITALS
SYSTOLIC BLOOD PRESSURE: 110 MMHG | RESPIRATION RATE: 16 BRPM | OXYGEN SATURATION: 98 % | HEIGHT: 67 IN | BODY MASS INDEX: 20.4 KG/M2 | DIASTOLIC BLOOD PRESSURE: 76 MMHG | HEART RATE: 86 BPM | WEIGHT: 130 LBS

## 2023-05-17 DIAGNOSIS — Z00.00 ANNUAL PHYSICAL EXAM: Primary | ICD-10-CM

## 2023-05-17 PROCEDURE — 3074F SYST BP LT 130 MM HG: CPT | Performed by: FAMILY MEDICINE

## 2023-05-17 PROCEDURE — 99396 PREV VISIT EST AGE 40-64: CPT | Performed by: FAMILY MEDICINE

## 2023-05-17 PROCEDURE — 3008F BODY MASS INDEX DOCD: CPT | Performed by: FAMILY MEDICINE

## 2023-05-17 PROCEDURE — 3078F DIAST BP <80 MM HG: CPT | Performed by: FAMILY MEDICINE

## 2023-05-17 RX ORDER — AZELAIC ACID 0.15 G/G
GEL TOPICAL
COMMUNITY
Start: 2023-04-25 | End: 2023-05-17 | Stop reason: ALTCHOICE

## 2023-05-25 ENCOUNTER — OFFICE VISIT (OUTPATIENT)
Dept: PODIATRY CLINIC | Facility: CLINIC | Age: 53
End: 2023-05-25

## 2023-05-25 DIAGNOSIS — B07.0 VERRUCA PLANTARIS: Primary | ICD-10-CM

## 2023-05-25 PROCEDURE — 99213 OFFICE O/P EST LOW 20 MIN: CPT | Performed by: PODIATRIST

## 2023-06-08 ENCOUNTER — MED REC SCAN ONLY (OUTPATIENT)
Dept: FAMILY MEDICINE CLINIC | Facility: CLINIC | Age: 53
End: 2023-06-08

## 2023-06-22 ENCOUNTER — OFFICE VISIT (OUTPATIENT)
Dept: PODIATRY CLINIC | Facility: CLINIC | Age: 53
End: 2023-06-22

## 2023-06-22 DIAGNOSIS — B07.0 VERRUCA PLANTARIS: Primary | ICD-10-CM

## 2023-06-22 PROCEDURE — 99213 OFFICE O/P EST LOW 20 MIN: CPT | Performed by: PODIATRIST

## 2023-07-26 ENCOUNTER — OFFICE VISIT (OUTPATIENT)
Dept: PODIATRY CLINIC | Facility: CLINIC | Age: 53
End: 2023-07-26

## 2023-07-26 DIAGNOSIS — B07.0 VERRUCA PLANTARIS: Primary | ICD-10-CM

## 2023-07-26 PROCEDURE — 99213 OFFICE O/P EST LOW 20 MIN: CPT | Performed by: PODIATRIST

## 2023-07-27 NOTE — PROGRESS NOTES
Cheryle Maxim is a 46year old female. Patient presents with: Follow - Up: L foot wart- Pt states wart is gone and denies any pain         HPI:   Patient returns the clinic for checkup on her wart therapy. She thinks they are gone. She also has a small blister on her left great toe she is hoping its not a wart. At today's visit reviewed nurse's history as taken above, allergies medications and medical history as documented below. All changes duly noted  Allergies: Penicillins and Sulfa Drugs Cross Reactors   Current Outpatient Medications   Medication Sig Dispense Refill    Salicylic Acid 46.3 % External Liquid Apply to the plantars wart once daily after scraping allowed to dry and cover with duct tape 10 mL 2    docusate sodium 100 MG Oral Cap       Ferrous Sulfate (IRON) 325 (65 Fe) MG Oral Tab       B Complex Vitamins (B COMPLEX OR) Take by mouth every other day. Cholecalciferol (VITAMIN D) 1000 UNITS Oral Tab Take 2 tablets by mouth daily.           Past Medical History:   Diagnosis Date    H. pylori infection     Problems with swallowing       Past Surgical History:   Procedure Laterality Date    COLONOSCOPY      OTHER      cyst removal R wrist    OTHER      oral surgery    OTHER      gum surgery    UPPER GI ENDOSCOPY PERFORMED  6/2013      Family History   Problem Relation Age of Onset    Cancer Father         esophageal    Cancer Mother         breast      Social History    Socioeconomic History      Marital status:       Number of children: 1    Occupational History      Occupation: collection billing    Tobacco Use      Smoking status: Never      Smokeless tobacco: Never    Substance and Sexual Activity      Alcohol use: Yes        Comment: occasionally      Drug use: No    Other Topics      Concerns:        Blood Transfusions: No        Caffeine Concern: Yes        Exercise: Yes          REVIEW OF SYSTEMS:   Today reviewed systens as documented below  GENERAL HEALTH: feels well otherwise  SKIN: Refer to exam below  RESPIRATORY: denies shortness of breath with exertion  CARDIOVASCULAR: denies chest pain on exertion  GI: denies abdominal pain and denies heartburn  NEURO: denies headaches    EXAM:   There were no vitals taken for this visit. GENERAL: well developed, well nourished, in no apparent distress  EXTREMITIES:   1. Integument: The skin on her left foot was evaluated I did not see any more verrucous lesion plantarly. There is a small hyperkeratosis with a small blister medial plantar left hallux which was uneventfully trimmed at today's visit. 2. Vascular: Patient has palpable pulses both dorsalis pedis and posterior tibial   3. Neurologic: The patient has intact sensorium there is no deficits noted   4. Musculoskeletal: The patient has good muscle strength. ASSESSMENT AND PLAN:   Diagnoses and all orders for this visit:    Verkeke plantaris        Plan: At today's office visit just recommended a terminal disinfection of most of her shoes with diluted bleach in tub and tile surfaces and then follow-up with us as needed. The patient indicates understanding of these issues and agrees to the plan.     Darlyn East DPM

## 2024-05-03 ENCOUNTER — LAB ENCOUNTER (OUTPATIENT)
Dept: LAB | Age: 54
End: 2024-05-03
Attending: FAMILY MEDICINE
Payer: COMMERCIAL

## 2024-05-03 DIAGNOSIS — Z00.00 ANNUAL PHYSICAL EXAM: ICD-10-CM

## 2024-05-03 LAB
ALBUMIN SERPL-MCNC: 4.2 G/DL (ref 3.4–5)
ALBUMIN/GLOB SERPL: 1.5 {RATIO} (ref 1–2)
ALP LIVER SERPL-CCNC: 65 U/L
ALT SERPL-CCNC: 20 U/L
ANION GAP SERPL CALC-SCNC: 7 MMOL/L (ref 0–18)
AST SERPL-CCNC: 10 U/L (ref 15–37)
BASOPHILS # BLD AUTO: 0.03 X10(3) UL (ref 0–0.2)
BASOPHILS NFR BLD AUTO: 0.6 %
BILIRUB SERPL-MCNC: 0.6 MG/DL (ref 0.1–2)
BUN BLD-MCNC: 18 MG/DL (ref 9–23)
CALCIUM BLD-MCNC: 9.3 MG/DL (ref 8.5–10.1)
CHLORIDE SERPL-SCNC: 105 MMOL/L (ref 98–112)
CHOLEST SERPL-MCNC: 169 MG/DL (ref ?–200)
CO2 SERPL-SCNC: 28 MMOL/L (ref 21–32)
CREAT BLD-MCNC: 1.04 MG/DL
EGFRCR SERPLBLD CKD-EPI 2021: 64 ML/MIN/1.73M2 (ref 60–?)
EOSINOPHIL # BLD AUTO: 0.14 X10(3) UL (ref 0–0.7)
EOSINOPHIL NFR BLD AUTO: 2.7 %
ERYTHROCYTE [DISTWIDTH] IN BLOOD BY AUTOMATED COUNT: 13.9 %
EST. AVERAGE GLUCOSE BLD GHB EST-MCNC: 114 MG/DL (ref 68–126)
FASTING PATIENT LIPID ANSWER: YES
FASTING STATUS PATIENT QL REPORTED: YES
GLOBULIN PLAS-MCNC: 2.8 G/DL (ref 2.8–4.4)
GLUCOSE BLD-MCNC: 86 MG/DL (ref 70–99)
HBA1C MFR BLD: 5.6 % (ref ?–5.7)
HCT VFR BLD AUTO: 46.4 %
HDLC SERPL-MCNC: 86 MG/DL (ref 40–59)
HGB BLD-MCNC: 14.7 G/DL
IMM GRANULOCYTES # BLD AUTO: 0.01 X10(3) UL (ref 0–1)
IMM GRANULOCYTES NFR BLD: 0.2 %
LDLC SERPL CALC-MCNC: 69 MG/DL (ref ?–100)
LYMPHOCYTES # BLD AUTO: 1.5 X10(3) UL (ref 1–4)
LYMPHOCYTES NFR BLD AUTO: 29.2 %
MCH RBC QN AUTO: 29.3 PG (ref 26–34)
MCHC RBC AUTO-ENTMCNC: 31.7 G/DL (ref 31–37)
MCV RBC AUTO: 92.6 FL
MONOCYTES # BLD AUTO: 0.25 X10(3) UL (ref 0.1–1)
MONOCYTES NFR BLD AUTO: 4.9 %
NEUTROPHILS # BLD AUTO: 3.2 X10 (3) UL (ref 1.5–7.7)
NEUTROPHILS # BLD AUTO: 3.2 X10(3) UL (ref 1.5–7.7)
NEUTROPHILS NFR BLD AUTO: 62.4 %
NONHDLC SERPL-MCNC: 83 MG/DL (ref ?–130)
OSMOLALITY SERPL CALC.SUM OF ELEC: 291 MOSM/KG (ref 275–295)
PLATELET # BLD AUTO: 165 10(3)UL (ref 150–450)
POTASSIUM SERPL-SCNC: 4.2 MMOL/L (ref 3.5–5.1)
PROT SERPL-MCNC: 7 G/DL (ref 6.4–8.2)
RBC # BLD AUTO: 5.01 X10(6)UL
SODIUM SERPL-SCNC: 140 MMOL/L (ref 136–145)
T4 FREE SERPL-MCNC: 0.9 NG/DL (ref 0.8–1.7)
TRIGL SERPL-MCNC: 72 MG/DL (ref 30–149)
TSI SER-ACNC: 2.79 MIU/ML (ref 0.36–3.74)
VIT B12 SERPL-MCNC: 759 PG/ML (ref 193–986)
VIT D+METAB SERPL-MCNC: 47.6 NG/ML (ref 30–100)
VLDLC SERPL CALC-MCNC: 11 MG/DL (ref 0–30)
WBC # BLD AUTO: 5.1 X10(3) UL (ref 4–11)

## 2024-05-03 PROCEDURE — 82306 VITAMIN D 25 HYDROXY: CPT | Performed by: FAMILY MEDICINE

## 2024-05-03 PROCEDURE — 82607 VITAMIN B-12: CPT | Performed by: FAMILY MEDICINE

## 2024-05-03 PROCEDURE — 83036 HEMOGLOBIN GLYCOSYLATED A1C: CPT | Performed by: FAMILY MEDICINE

## 2024-05-03 PROCEDURE — 80061 LIPID PANEL: CPT | Performed by: FAMILY MEDICINE

## 2024-05-03 PROCEDURE — 84439 ASSAY OF FREE THYROXINE: CPT | Performed by: FAMILY MEDICINE

## 2024-05-03 PROCEDURE — 80050 GENERAL HEALTH PANEL: CPT | Performed by: FAMILY MEDICINE

## 2024-05-14 ENCOUNTER — PATIENT MESSAGE (OUTPATIENT)
Dept: FAMILY MEDICINE CLINIC | Facility: CLINIC | Age: 54
End: 2024-05-14

## 2024-05-14 ENCOUNTER — OFFICE VISIT (OUTPATIENT)
Dept: FAMILY MEDICINE CLINIC | Facility: CLINIC | Age: 54
End: 2024-05-14

## 2024-05-14 VITALS
WEIGHT: 132 LBS | HEART RATE: 85 BPM | RESPIRATION RATE: 16 BRPM | SYSTOLIC BLOOD PRESSURE: 116 MMHG | OXYGEN SATURATION: 98 % | HEIGHT: 67 IN | DIASTOLIC BLOOD PRESSURE: 84 MMHG | BODY MASS INDEX: 20.72 KG/M2

## 2024-05-14 DIAGNOSIS — M25.551 RIGHT HIP PAIN: ICD-10-CM

## 2024-05-14 DIAGNOSIS — Z00.00 ANNUAL PHYSICAL EXAM: Primary | ICD-10-CM

## 2024-05-14 DIAGNOSIS — Z12.11 COLON CANCER SCREENING: ICD-10-CM

## 2024-05-14 DIAGNOSIS — Z13.820 SCREENING FOR OSTEOPOROSIS: ICD-10-CM

## 2024-05-14 DIAGNOSIS — R01.1 NEWLY RECOGNIZED HEART MURMUR: ICD-10-CM

## 2024-05-14 PROCEDURE — 99396 PREV VISIT EST AGE 40-64: CPT | Performed by: FAMILY MEDICINE

## 2024-05-14 PROCEDURE — 3079F DIAST BP 80-89 MM HG: CPT | Performed by: FAMILY MEDICINE

## 2024-05-14 PROCEDURE — 3008F BODY MASS INDEX DOCD: CPT | Performed by: FAMILY MEDICINE

## 2024-05-14 PROCEDURE — 3074F SYST BP LT 130 MM HG: CPT | Performed by: FAMILY MEDICINE

## 2024-05-14 NOTE — TELEPHONE ENCOUNTER
From: Yvrose Tan  To: Lorin Nettles  Sent: 5/14/2024 2:34 PM CDT  Subject: insurance cards    Alaina VELÁZQUEZ said you don't have a copy my insurance card on file so I have attached it here.

## 2024-05-14 NOTE — PROGRESS NOTES
HPI:   Yvrose Tan is a 53 year old female who presents for a complete physical exam.     Wt Readings from Last 6 Encounters:   05/14/24 132 lb (59.9 kg)   05/17/23 130 lb (59 kg)   05/11/22 130 lb (59 kg)   05/06/21 125 lb (56.7 kg)   07/13/20 126 lb (57.2 kg)   04/08/19 126 lb (57.2 kg)     Body mass index is 20.67 kg/m².       Sees gyne - Dr. Frederick  Pt has fibroids   H/o endometrial ablation   In menopause for the last few years // now on estradiol   Still with some hot flashes   all previous paps normal  No vaginal discharge  + bladder dysfunction- better overall    + performing self breast exams  last mammogram- aug 2023   dexa - never   c-scope - 2014  + calcium and vit D supplementation  family history of breast cancer- mom at 68    ((H/o extensive GI eval  Pt avoids certain foods and she does well. Onions/ peppers/ brocoli/ cherries))    Work has been very stressful // doing the billing     Hair has been thinning     Current Outpatient Medications   Medication Sig Dispense Refill    ESTRADIOL OR Take by mouth.      docusate sodium 100 MG Oral Cap       Ferrous Sulfate (IRON) 325 (65 Fe) MG Oral Tab       B Complex Vitamins (B COMPLEX OR) Take by mouth every other day.        Cholecalciferol (VITAMIN D) 1000 UNITS Oral Tab Take 2 tablets by mouth daily.          Past Medical History:    H. pylori infection    Problems with swallowing      Past Surgical History:   Procedure Laterality Date    Colonoscopy      Other      cyst removal R wrist    Other      oral surgery    Other      gum surgery    Upper gi endoscopy performed  6/2013      Family History   Problem Relation Age of Onset    Cancer Father         esophageal    Cancer Mother         breast      Social History:   Social History     Socioeconomic History    Marital status:     Number of children: 1   Occupational History    Occupation: collection billing   Tobacco Use    Smoking status: Never    Smokeless tobacco: Never   Substance and Sexual  Activity    Alcohol use: Yes     Comment: occasionally    Drug use: No   Other Topics Concern    Blood Transfusions No    Caffeine Concern Yes    Exercise Yes     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   Exercise: minimal   Diet: healthy      REVIEW OF SYSTEMS:   GENERAL: feels well otherwise  SKIN: denies any unusual skin lesions  EYES:denies blurred vision or double vision  HEENT: denies nasal congestion, sinus pain or ST  LUNGS: denies shortness of breath with exertion  CARDIOVASCULAR: denies chest pain on exertion  GI: denies abdominal pain,denies heartburn  : denies dysuria, vaginal discharge or itching  MUSCULOSKELETAL: denies back pain  PSYCHE: denies depression or anxiety  ALL/ASTHMA: denies asthma    EXAM:   /84   Pulse 85   Resp 16   Ht 5' 7\" (1.702 m)   Wt 132 lb (59.9 kg)   SpO2 98%   BMI 20.67 kg/m²   Body mass index is 20.67 kg/m².   GENERAL: alert and oriented X 3, well developed, well nourished,in no apparent distress  CARDIO: RRR 2/6 murmur  LUNGS: clear to auscultation  NECK: supple,no adenopathy,no thyromegaly  HEENT: atraumatic, normocephalic,ears clear ; throat clear  EYES:PERRLA, EOMI, normal,conjunctiva are clear  SKIN: norashes,no suspicious lesions, multiple moles  GI: good BS's,no masses, HSM or tenderness  CHEST: some  chest tenderness  MUSCULOSKELETAL: back is not tender,FROM of the back, right hip reduced rom   EXTREMITIES: no cyanosis, clubbing or edema  NEURO: cranial nerves are intact,motor and sensory are grossly intact    ASSESSMENT AND PLAN:   Yvrose Tan is a 53 year old female who presents for annual physical.    1. Annual physical exam      2. Screening for osteoporosis    - XR DEXA BONE DENSITOMETRY (CPT=77080); Future    3. Colon cancer screening    - Gastro Referral - In Network    4. Newly recognized heart murmur    - CARD ECHO 2D DOPPLER (CPT=93306); Future    5. Right hip pain    - XR Hip (non-replaced) right w or wo pelvis - [facility]  Consult Only; Future    See gyn and Derm    Discussed diet, exercise,calcium, vitamin D, fish oil and self breast exams.   Questions answered and patient indicates understanding of these issues and agrees to the plan.  Follow up in 1 year or sooner if needed.

## 2024-05-15 ENCOUNTER — HOSPITAL ENCOUNTER (OUTPATIENT)
Dept: GENERAL RADIOLOGY | Age: 54
Discharge: HOME | End: 2024-05-15
Attending: FAMILY MEDICINE

## 2024-05-15 ENCOUNTER — HOSPITAL ENCOUNTER (OUTPATIENT)
Dept: BONE DENSITY | Age: 54
Discharge: HOME | End: 2024-05-15
Attending: FAMILY MEDICINE

## 2024-05-15 DIAGNOSIS — M25.551 RIGHT HIP PAIN: ICD-10-CM

## 2024-05-15 DIAGNOSIS — Z13.820 SCREENING FOR OSTEOPOROSIS: ICD-10-CM

## 2024-05-15 PROCEDURE — 77080 DXA BONE DENSITY AXIAL: CPT | Performed by: FAMILY MEDICINE

## 2024-05-15 PROCEDURE — 73502 X-RAY EXAM HIP UNI 2-3 VIEWS: CPT | Performed by: FAMILY MEDICINE

## 2024-05-20 ENCOUNTER — HOSPITAL ENCOUNTER (OUTPATIENT)
Dept: CV DIAGNOSTICS | Age: 54
Discharge: HOME | End: 2024-05-20
Attending: FAMILY MEDICINE

## 2024-05-20 DIAGNOSIS — R01.1 NEWLY RECOGNIZED HEART MURMUR: ICD-10-CM

## 2024-05-20 PROCEDURE — 93306 TTE W/DOPPLER COMPLETE: CPT | Performed by: FAMILY MEDICINE

## 2024-08-12 PROBLEM — Z12.11 SPECIAL SCREENING FOR MALIGNANT NEOPLASMS, COLON: Status: ACTIVE | Noted: 2024-08-12

## 2024-08-12 PROBLEM — D12.0 BENIGN NEOPLASM OF CECUM: Status: ACTIVE | Noted: 2024-08-12

## 2024-08-12 PROBLEM — K63.5 POLYP OF COLON: Status: ACTIVE | Noted: 2024-08-12

## 2024-12-04 ENCOUNTER — TELEPHONE (OUTPATIENT)
Dept: FAMILY MEDICINE CLINIC | Facility: CLINIC | Age: 54
End: 2024-12-04

## 2025-05-02 ENCOUNTER — LAB ENCOUNTER (OUTPATIENT)
Dept: LAB | Age: 55
End: 2025-05-02
Attending: FAMILY MEDICINE
Payer: COMMERCIAL

## 2025-05-02 DIAGNOSIS — E61.1 IRON DEFICIENCY: ICD-10-CM

## 2025-05-02 DIAGNOSIS — Z00.00 ANNUAL PHYSICAL EXAM: ICD-10-CM

## 2025-05-02 LAB
ALBUMIN SERPL-MCNC: 4.5 G/DL (ref 3.2–4.8)
ALBUMIN/GLOB SERPL: 2 {RATIO} (ref 1–2)
ALP LIVER SERPL-CCNC: 53 U/L (ref 41–108)
ALT SERPL-CCNC: 13 U/L (ref 10–49)
ANION GAP SERPL CALC-SCNC: 14 MMOL/L (ref 0–18)
AST SERPL-CCNC: 16 U/L (ref ?–34)
BASOPHILS # BLD AUTO: 0.03 X10(3) UL (ref 0–0.2)
BASOPHILS NFR BLD AUTO: 0.4 %
BILIRUB SERPL-MCNC: 0.6 MG/DL (ref 0.3–1.2)
BUN BLD-MCNC: 16 MG/DL (ref 9–23)
CALCIUM BLD-MCNC: 9.1 MG/DL (ref 8.7–10.6)
CHLORIDE SERPL-SCNC: 102 MMOL/L (ref 98–112)
CHOLEST SERPL-MCNC: 142 MG/DL (ref ?–200)
CO2 SERPL-SCNC: 24 MMOL/L (ref 21–32)
CREAT BLD-MCNC: 1.12 MG/DL (ref 0.55–1.02)
EGFRCR SERPLBLD CKD-EPI 2021: 58 ML/MIN/1.73M2 (ref 60–?)
EOSINOPHIL # BLD AUTO: 0.12 X10(3) UL (ref 0–0.7)
EOSINOPHIL NFR BLD AUTO: 1.6 %
ERYTHROCYTE [DISTWIDTH] IN BLOOD BY AUTOMATED COUNT: 14.3 %
EST. AVERAGE GLUCOSE BLD GHB EST-MCNC: 108 MG/DL (ref 68–126)
FASTING PATIENT LIPID ANSWER: YES
FASTING STATUS PATIENT QL REPORTED: YES
GLOBULIN PLAS-MCNC: 2.3 G/DL (ref 2–3.5)
GLUCOSE BLD-MCNC: 85 MG/DL (ref 70–99)
HBA1C MFR BLD: 5.4 % (ref ?–5.7)
HCT VFR BLD AUTO: 46.4 % (ref 35–48)
HDLC SERPL-MCNC: 69 MG/DL (ref 40–59)
HGB BLD-MCNC: 15.4 G/DL (ref 12–16)
IMM GRANULOCYTES # BLD AUTO: 0.02 X10(3) UL (ref 0–1)
IMM GRANULOCYTES NFR BLD: 0.3 %
LDLC SERPL CALC-MCNC: 60 MG/DL (ref ?–100)
LYMPHOCYTES # BLD AUTO: 1.85 X10(3) UL (ref 1–4)
LYMPHOCYTES NFR BLD AUTO: 24.4 %
MCH RBC QN AUTO: 30.2 PG (ref 26–34)
MCHC RBC AUTO-ENTMCNC: 33.2 G/DL (ref 31–37)
MCV RBC AUTO: 91 FL (ref 80–100)
MONOCYTES # BLD AUTO: 0.36 X10(3) UL (ref 0.1–1)
MONOCYTES NFR BLD AUTO: 4.8 %
NEUTROPHILS # BLD AUTO: 5.19 X10 (3) UL (ref 1.5–7.7)
NEUTROPHILS # BLD AUTO: 5.19 X10(3) UL (ref 1.5–7.7)
NEUTROPHILS NFR BLD AUTO: 68.5 %
NONHDLC SERPL-MCNC: 73 MG/DL (ref ?–130)
OSMOLALITY SERPL CALC.SUM OF ELEC: 290 MOSM/KG (ref 275–295)
PLATELET # BLD AUTO: 189 10(3)UL (ref 150–450)
POTASSIUM SERPL-SCNC: 4 MMOL/L (ref 3.5–5.1)
PROT SERPL-MCNC: 6.8 G/DL (ref 5.7–8.2)
RBC # BLD AUTO: 5.1 X10(6)UL (ref 3.8–5.3)
SODIUM SERPL-SCNC: 140 MMOL/L (ref 136–145)
T4 FREE SERPL-MCNC: 1.1 NG/DL (ref 0.8–1.7)
TRIGL SERPL-MCNC: 63 MG/DL (ref 30–149)
TSI SER-ACNC: 4.53 UIU/ML (ref 0.55–4.78)
VIT B12 SERPL-MCNC: 788 PG/ML (ref 211–911)
VIT D+METAB SERPL-MCNC: 55 NG/ML (ref 30–100)
VLDLC SERPL CALC-MCNC: 9 MG/DL (ref 0–30)
WBC # BLD AUTO: 7.6 X10(3) UL (ref 4–11)

## 2025-05-02 PROCEDURE — 84439 ASSAY OF FREE THYROXINE: CPT | Performed by: FAMILY MEDICINE

## 2025-05-02 PROCEDURE — 80050 GENERAL HEALTH PANEL: CPT | Performed by: FAMILY MEDICINE

## 2025-05-02 PROCEDURE — 80061 LIPID PANEL: CPT | Performed by: FAMILY MEDICINE

## 2025-05-02 PROCEDURE — 82306 VITAMIN D 25 HYDROXY: CPT | Performed by: FAMILY MEDICINE

## 2025-05-02 PROCEDURE — 82607 VITAMIN B-12: CPT | Performed by: FAMILY MEDICINE

## 2025-05-02 PROCEDURE — 83036 HEMOGLOBIN GLYCOSYLATED A1C: CPT | Performed by: FAMILY MEDICINE

## 2025-05-13 ENCOUNTER — OFFICE VISIT (OUTPATIENT)
Dept: FAMILY MEDICINE CLINIC | Facility: CLINIC | Age: 55
End: 2025-05-13
Payer: COMMERCIAL

## 2025-05-13 VITALS
BODY MASS INDEX: 19.93 KG/M2 | SYSTOLIC BLOOD PRESSURE: 110 MMHG | RESPIRATION RATE: 16 BRPM | HEART RATE: 76 BPM | OXYGEN SATURATION: 100 % | DIASTOLIC BLOOD PRESSURE: 74 MMHG | WEIGHT: 127 LBS | HEIGHT: 67 IN

## 2025-05-13 DIAGNOSIS — R06.83 SNORING: ICD-10-CM

## 2025-05-13 DIAGNOSIS — Z00.00 ANNUAL PHYSICAL EXAM: Primary | ICD-10-CM

## 2025-05-13 DIAGNOSIS — I34.0 NONRHEUMATIC MITRAL VALVE REGURGITATION: ICD-10-CM

## 2025-05-13 DIAGNOSIS — I07.1 TRICUSPID VALVE INSUFFICIENCY, UNSPECIFIED ETIOLOGY: ICD-10-CM

## 2025-05-13 PROCEDURE — 3008F BODY MASS INDEX DOCD: CPT | Performed by: FAMILY MEDICINE

## 2025-05-13 PROCEDURE — 3078F DIAST BP <80 MM HG: CPT | Performed by: FAMILY MEDICINE

## 2025-05-13 PROCEDURE — 99396 PREV VISIT EST AGE 40-64: CPT | Performed by: FAMILY MEDICINE

## 2025-05-13 PROCEDURE — 3074F SYST BP LT 130 MM HG: CPT | Performed by: FAMILY MEDICINE

## 2025-05-13 PROCEDURE — 90677 PCV20 VACCINE IM: CPT | Performed by: FAMILY MEDICINE

## 2025-05-13 PROCEDURE — 90471 IMMUNIZATION ADMIN: CPT | Performed by: FAMILY MEDICINE

## 2025-05-13 RX ORDER — CETIRIZINE HYDROCHLORIDE 10 MG/1
TABLET ORAL
COMMUNITY
Start: 2024-03-01

## 2025-05-13 NOTE — PROGRESS NOTES
The following individual(s) verbally consented to be recorded using ambient AI listening technology and understand that they can each withdraw their consent to this listening technology at any point by asking the clinician to turn off or pause the recording:    Patient name: Yvrose Tan  Additional names:  n/a      HPI:   Yvrose Tan is a 54 year old female who presents for a complete physical exam.     Wt Readings from Last 6 Encounters:   05/13/25 127 lb (57.6 kg)   07/24/24 130 lb (59 kg)   07/15/24 130 lb (59 kg)   05/14/24 132 lb (59.9 kg)   05/17/23 130 lb (59 kg)   05/11/22 130 lb (59 kg)     Body mass index is 19.89 kg/m².       Sees gyne - Dr. Frederick  Pt has fibroids   H/o endometrial ablation   In menopause for the last few years //  on estradiol   Still with some hot flashes   all previous paps normal  No vaginal discharge  + bladder dysfunction- better overall    + performing self breast exams  last mammogram- 2024  dexa - 2024  c-scope - UTD  + calcium and vit D supplementation  family history of breast cancer- mom at 68    ((H/o extensive GI eval  Pt avoids certain foods and she does well. Onions/ peppers/ brocoli/ cherries))        Current Outpatient Medications   Medication Sig Dispense Refill    cetirizine (ZYRTEC ALLERGY) 10 MG Oral Tab       ESTRADIOL OR Take by mouth.      docusate sodium 100 MG Oral Cap       Ferrous Sulfate (IRON) 325 (65 Fe) MG Oral Tab       B Complex Vitamins (B COMPLEX OR) Take by mouth every other day.        Cholecalciferol (VITAMIN D) 1000 UNITS Oral Tab Take 2 tablets by mouth daily.          Past Medical History:    Abdominal hernia    Bloating    Frequent urination    H. pylori infection    Headache disorder    History of cardiac murmur    Night sweats    Problems with swallowing    Stress      Past Surgical History:   Procedure Laterality Date    Colonoscopy      Colonoscopy  6/23/14    Dr Thompson    Hernia surgery      Other      cyst removal R  wrist    Other      oral surgery    Other      gum surgery    Tonsillectomy  9/1/1971    I was [age] year old    Upper gi endoscopy performed  06/2013      Family History   Problem Relation Age of Onset    Cancer Father         esophageal    Breast Cancer Mother         survivor    Cancer Mother         breast      Social History:   Social History     Socioeconomic History    Marital status:     Number of children: 1   Occupational History    Occupation: collection billing   Tobacco Use    Smoking status: Never    Smokeless tobacco: Never   Substance and Sexual Activity    Alcohol use: Yes     Alcohol/week: 2.0 standard drinks of alcohol     Types: 2 Glasses of wine per week     Comment: very light drinker    Drug use: No   Other Topics Concern    Blood Transfusions No    Caffeine Concern Yes    Exercise Yes     Social Drivers of Health      Received [facility]    Housing Stability     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   Exercise: yes  Diet: healthy      REVIEW OF SYSTEMS:   GENERAL: feels well otherwise  SKIN: denies any unusual skin lesions  EYES:denies blurred vision or double vision  HEENT: denies nasal congestion, sinus pain or ST  LUNGS: denies shortness of breath with exertion  CARDIOVASCULAR: denies chest pain on exertion  GI: denies abdominal pain,denies heartburn  : denies dysuria, vaginal discharge or itching  MUSCULOSKELETAL: denies back pain  PSYCHE: denies depression or anxiety  ALL/ASTHMA: denies asthma    EXAM:   /74   Pulse 76   Resp 16   Ht 5' 7\" (1.702 m)   Wt 127 lb (57.6 kg)   SpO2 100%   BMI 19.89 kg/m²   Body mass index is 19.89 kg/m².   GENERAL: alert and oriented X 3, well developed, well nourished,in no apparent distress  CARDIO: RRR 2/6 murmur  LUNGS: clear to auscultation  NECK: supple,no adenopathy,no thyromegaly  HEENT: atraumatic, normocephalic,ears clear ; throat clear  EYES:PERRLA, EOMI, normal,conjunctiva are  clear  SKIN: norashes,no suspicious lesions, multiple moles  GI: good BS's,no masses, HSM or tenderness  CHEST: some  chest tenderness  MUSCULOSKELETAL: back is not tender  EXTREMITIES: no cyanosis, clubbing or edema  NEURO: cranial nerves are intact,motor and sensory are grossly intact    ASSESSMENT AND PLAN:   Yvrose Tan is a 54 year old female who presents for annual physical.    1. Annual physical exam      2. Nonrheumatic mitral valve regurgitation    - CARD ECHO 2D DOPPLER (CPT=93306); Future    3. Tricuspid valve insufficiency, unspecified etiology    - CARD ECHO 2D DOPPLER (CPT=93306); Future    4. Snoring    - Diagnostic Sleep Study-split night PAP implemented if criteria met  - General sleep study; Future      See gyn and Derm    Discussed diet, exercise,calcium, vitamin D, fish oil and self breast exams.   Questions answered and patient indicates understanding of these issues and agrees to the plan.  Follow up in 1 year or sooner if needed.

## 2025-05-14 ENCOUNTER — ORDER TRANSCRIPTION (OUTPATIENT)
Dept: ADMINISTRATIVE | Facility: HOSPITAL | Age: 55
End: 2025-05-14

## 2025-05-14 DIAGNOSIS — Z13.6 SCREENING FOR HEART DISEASE: Primary | ICD-10-CM

## 2025-05-17 ENCOUNTER — HOSPITAL ENCOUNTER (OUTPATIENT)
Dept: CT IMAGING | Age: 55
Discharge: HOME | End: 2025-05-17
Attending: FAMILY MEDICINE

## 2025-05-17 DIAGNOSIS — Z13.6 SCREENING FOR HEART DISEASE: ICD-10-CM

## 2025-05-17 DIAGNOSIS — Z13.9 ENCOUNTER FOR SCREENING: ICD-10-CM

## 2025-07-23 ENCOUNTER — MED REC SCAN ONLY (OUTPATIENT)
Facility: CLINIC | Age: 55
End: 2025-07-23

## 2025-08-05 ENCOUNTER — OFFICE VISIT (OUTPATIENT)
Dept: SLEEP CENTER | Age: 55
End: 2025-08-05
Attending: Other

## 2025-08-05 DIAGNOSIS — R06.83 SNORING: ICD-10-CM

## 2025-08-05 PROCEDURE — 95806 SLEEP STUDY UNATT&RESP EFFT: CPT

## 2025-08-22 ENCOUNTER — HOSPITAL ENCOUNTER (OUTPATIENT)
Dept: CV DIAGNOSTICS | Age: 55
Discharge: HOME | End: 2025-08-22
Attending: FAMILY MEDICINE

## 2025-08-22 DIAGNOSIS — I34.0 NONRHEUMATIC MITRAL VALVE REGURGITATION: ICD-10-CM

## 2025-08-22 DIAGNOSIS — I07.1 TRICUSPID VALVE INSUFFICIENCY, UNSPECIFIED ETIOLOGY: ICD-10-CM

## 2025-08-22 PROCEDURE — 93306 TTE W/DOPPLER COMPLETE: CPT | Performed by: FAMILY MEDICINE

## (undated) DIAGNOSIS — M25.552 LEFT HIP PAIN: Primary | ICD-10-CM

## (undated) DIAGNOSIS — Z00.00 ANNUAL PHYSICAL EXAM: ICD-10-CM

## (undated) DEVICE — MINI LAP PACK-LF: Brand: MEDLINE INDUSTRIES, INC.

## (undated) DEVICE — SUTURE VICRYL 3-0 SH

## (undated) DEVICE — KENDALL SCD EXPRESS SLEEVES, KNEE LENGTH, MEDIUM: Brand: KENDALL SCD

## (undated) DEVICE — TOWEL: OR BLU 80/CS: Brand: MEDICAL ACTION INDUSTRIES

## (undated) DEVICE — SOL  .9 1000ML BTL

## (undated) DEVICE — CHLORAPREP 26ML APPLICATOR

## (undated) DEVICE — GLOVE SURG SENSICARE SZ 6-1/2

## (undated) DEVICE — 3M(TM) MICROPORE TAPE DISPENSER 1535-2: Brand: 3M™ MICROPORE™

## (undated) DEVICE — SUTURE VICRYL 5-0 P-3

## (undated) DEVICE — GAUZE SPONGES,USP TYPE VII GAUZE, 12 PLY: Brand: CURITY

## (undated) DEVICE — STRL PENROSE DRAIN 18" X 1/4": Brand: CARDINAL HEALTH

## (undated) DEVICE — GLOVE BIOGEL M SURG SZ 6-1/2

## (undated) NOTE — MR AVS SNAPSHOT
[facility]               Thank you for choosing us for your health care visit with Samantha Parekh PT.   We are glad to serve you and happy to provide you with this summary of yo B COMPLEX OR   Take  by mouth. Fluticasone Propionate 50 MCG/ACT Susp   2 sprays by Nasal route nightly. Commonly known as:  FLONASE           MIRENA (52 MG) 20 MCG/24HR Iud   Generic drug:  Levonorgestrel   1 each by Intrauterine route once.

## (undated) NOTE — MR AVS SNAPSHOT
19 [facility] 66, [facility] 34 [facility]               Thank you for choosing us for your health care visit with Jorge Alberto Moses PT.   We are glad to serve you and happy to provide you with this summary of yo view more details from this visit by going to [email or web address]. If you've recently had a stay at the Hospital you can access your discharge instructions in Workfacehart by going to Visits < Admission Summaries.  If you've been to the Emergency Depar

## (undated) NOTE — Clinical Note
Patient Name: Sarai Mckeon  YOB: 1970          MRN number:  [id number]  Date:  4/14/2017  Referring Physician:  Kami Walters     UPPER EXTREMITY EVALUATION: Referring Physician: Dr. Hugh Young: Left shoulder strain, initial encounter (T70 chest and arm  Irritability: moderate - chest pain seems to decrease quickly after irritated however symptoms are constant.  Arm symptoms go away quick with shaking and moving  Nature: mechanical - comes on with positioning, Possibly an inflammatory compone improve posture and postural awareness. Will continue to assess for other impairments at next session as well as update POC in regards to evaluative findings.      Precautions:  None  OBJECTIVE:   BP: 108/68  HR: 77  Observation/Posture: moderate forward ro Shoulder = posterior glide - WNL R=L; inferior glide - WNL R=L    Flexibility: visual assessment in supine, anteriorly rolled shoulders, possible length restrictions in pec minor. Will more specifically assess at next visit.       Strength/MMT:  Shoulder El in chest and left arm to decrease interference with job requirements. (10 visits)    Frequency / Duration: Patient will be seen for 1-2 x/week or a total of 10 visits over a 90 day period. Treatment will include: Manual Therapy; Therapeutic Exercises;  [place]

## (undated) NOTE — LETTER
GENERAL SURGERY    Chi Tinsley MD, FACS, Juan Avery. Yumiko Rogers MD, Hemant Centeno MD, FACS  Vicki Houser.  [facility] DEYANIRA HADLEY MD, John Esqueda MD, FACS  Sherice Sr, DO Julee Amass, MD Jacqueline Lawn PA-C Cheree Frankel PA-C

## (undated) NOTE — MR AVS SNAPSHOT
19 [facility], [facility]               Thank you for choosing us for your health care visit with Dotty Dubin, PT.   We are glad to serve you and happy to provide you with this summary of yo Summaries. If you've been to the Emergency Department or your doctor's office, you can view your past visit information in Simio by going to Visits < Visit Summaries. Simio questions? Call [phone] for help.   Simio is NOT to be used for urge

## (undated) NOTE — MR AVS SNAPSHOT
19 [facility] 66, [facility] 34 [facility]               Thank you for choosing us for your health care visit with Rose Cotton PT.   We are glad to serve you and happy to provide you with this summary of yo B COMPLEX OR   Take  by mouth. Fluticasone Propionate 50 MCG/ACT Susp   2 sprays by Nasal route nightly. Commonly known as:  FLONASE           MIRENA (52 MG) 20 MCG/24HR Iud   Generic drug:  Levonorgestrel   1 each by Intrauterine route once.

## (undated) NOTE — MR AVS SNAPSHOT
19 [facility] 66, [facility] 34 [facility]               Thank you for choosing us for your health care visit with Lila Lilly PT.   We are glad to serve you and happy to provide you with this summary of yo

## (undated) NOTE — MR AVS SNAPSHOT
19 [facility] 66, [facility] 34 [facility]               Thank you for choosing us for your health care visit with Dotty Dubin, PT.   We are glad to serve you and happy to provide you with this summary of yo This list is accurate as of: 4/11/17 11:59 PM.  Always use your most recent med list.                B COMPLEX OR   Take  by mouth. Fluticasone Propionate 50 MCG/ACT Susp   2 sprays by Nasal route nightly.    Commonly known as:  Shamika Bennett

## (undated) NOTE — LETTER
04/11/18        Humberto Pastures  [address]      Dear Lorraine Augustin,    [facility] records indicate that you have outstanding lab work and or testing that was ordered for you and has not yet been completed:          T4 FREE [866]      TSH

## (undated) NOTE — LETTER
10/31/17    Patient: Diana Umana  : 1970 Visit date: 10/31/2017    Dear  Dr. Denny Pagan, DO,    Thank you for referring Diana Umana to my practice. Please find my assessment and plan below.           Assessment:      The patient is a 55-year-old fem

## (undated) NOTE — MR AVS SNAPSHOT
19 [facility] 66, [facility] 34 [facility]               Thank you for choosing us for your health care visit with Niko Negron PT.   We are glad to serve you and happy to provide you with this summary of yo 1 each by Intrauterine route once. Vitamin D 1000 units Tabs   Take 2 tablets by mouth.                    MyChart     Visit MyChart  You can access your MyChart to more actively manage your health care and view more details from this visit by [facility]

## (undated) NOTE — MR AVS SNAPSHOT
[address]  [address] [facility], [address] [id number] [phone]               Thank you for choosing us for your health care visit with Sunni Foley DO.   We are glad to serve you and happy to provide you with this sousa MIRENA (52 MG) 20 MCG/24HR Iud   Generic drug:  Levonorgestrel   1 each by Intrauterine route once. Vitamin D 1000 units Tabs   Take 2 tablets by mouth.                    MyChart     Visit MyChart  You can access your MyChart to more actively ma

## (undated) NOTE — Clinical Note
Patient Name: Brooke Sommers  YOB: 1970          MRN number:  [id number]  Date:  6/6/2017  Referring Physician:  Stefan Lee    Dear Dr. Johnathon Jacobs,    Discharge Summary  Pt has attended 9, cancelled 0, and no shown 0 visits in Physical Therapy. Palpation: tenderness in pec major and minor    Shoulder AROM EVAL AROM DC   Flexion: R 161; L 155 (pulling in chest, sustained no change in symptoms, repeated no change)  Abduction: R (WNL not formally measured);  L 138 (less symptomatic than flexion)  Estrada spine in attempts to reproduce and find source of distal arm symptoms. 1 visit MET 4/20/2017   · Pt will be IND and compliant with HEP (10 visits) MET 6/6/2017   · Pt to report ability to drive in car for >65 min with <2/10 symptoms in chest and left arm (1

## (undated) NOTE — MR AVS SNAPSHOT
19 [facility] 66, [facility] 34 [facility]               Thank you for choosing us for your health care visit with Janes Langford PT.   We are glad to serve you and happy to provide you with this summary of yo Summaries. If you've been to the Emergency Department or your doctor's office, you can view your past visit information in ImmunotEGG by going to Visits < Visit Summaries. ImmunotEGG questions? Call [phone] for help.   ImmunotEGG is NOT to be used for urge

## (undated) NOTE — MR AVS SNAPSHOT
19 [facility] 66, [facility]               Thank you for choosing us for your health care visit with Meliza Rojo PT.   We are glad to serve you and happy to provide you with this summary of yo Call [phone] for help. AppSurfert is NOT to be used for urgent needs. For medical emergencies, dial 911.            Visit [facility] online at  [email or web address]